# Patient Record
Sex: MALE | Race: OTHER | NOT HISPANIC OR LATINO | Employment: FULL TIME | URBAN - METROPOLITAN AREA
[De-identification: names, ages, dates, MRNs, and addresses within clinical notes are randomized per-mention and may not be internally consistent; named-entity substitution may affect disease eponyms.]

---

## 2022-04-29 ENCOUNTER — HOSPITAL ENCOUNTER (OUTPATIENT)
Facility: CLINIC | Age: 27
Discharge: HOME | End: 2022-04-29
Attending: EMERGENCY MEDICINE
Payer: COMMERCIAL

## 2022-04-29 VITALS
RESPIRATION RATE: 16 BRPM | TEMPERATURE: 98.7 F | DIASTOLIC BLOOD PRESSURE: 82 MMHG | SYSTOLIC BLOOD PRESSURE: 130 MMHG | OXYGEN SATURATION: 99 % | HEART RATE: 83 BPM

## 2022-04-29 DIAGNOSIS — N48.89 PENILE CYST: Primary | ICD-10-CM

## 2022-04-29 PROCEDURE — 99202 OFFICE O/P NEW SF 15 MIN: CPT | Performed by: NURSE PRACTITIONER

## 2022-04-29 PROCEDURE — S9083 URGENT CARE CENTER GLOBAL: HCPCS | Performed by: NURSE PRACTITIONER

## 2022-04-29 ASSESSMENT — ENCOUNTER SYMPTOMS
COUGH: 0
DIARRHEA: 0
WOUND: 0
DIZZINESS: 0
VOMITING: 0
FLANK PAIN: 0
NAUSEA: 0
WEAKNESS: 0
CHILLS: 0
BACK PAIN: 0
FREQUENCY: 0
NECK PAIN: 0
ADENOPATHY: 0
NECK STIFFNESS: 0
ABDOMINAL PAIN: 0
RHINORRHEA: 0
SORE THROAT: 0
MYALGIAS: 0
ARTHRALGIAS: 0
FEVER: 0
NUMBNESS: 0
PALPITATIONS: 0
JOINT SWELLING: 0
DYSURIA: 0
LIGHT-HEADEDNESS: 0
SHORTNESS OF BREATH: 0

## 2022-04-29 NOTE — DISCHARGE INSTRUCTIONS
Warm compresses to area.  Take tylenol/ibuprofen as needed for pain.  Keep area clean and dry until healed.    Followup with urology.    If your symptoms worsen or otherwise concerned, please visit your local emergency department.    Thank you for choosing [facility]!

## 2022-04-29 NOTE — ED PROVIDER NOTES
Emergency Medicine Note  HPI   HISTORY OF PRESENT ILLNESS     27 y/o male c/o penile cyst. Reports he has cyst on penis since a child, never saw specialist regarding sx, but never had an issue with it in the past. States this morning he noticed the area was irritated and draining. Denies fever, chills, penile pain/discharge, testicular pain, problems urinating, concern for STDs, or recent trauma to area.            Patient History   PAST HISTORY     Reviewed from Nursing Triage:  Tobacco  Allergies  Meds  Problems  Med Hx  Surg Hx  Fam Hx  Soc   Hx        History reviewed. No pertinent past medical history.    History reviewed. No pertinent surgical history.    History reviewed. No pertinent family history.    Social History     Tobacco Use   • Smoking status: Never Smoker   • Smokeless tobacco: Never Used   Vaping Use   • Vaping Use: Never used   Substance Use Topics   • Alcohol use: Yes     Comment: socially   • Drug use: Never         Review of Systems   REVIEW OF SYSTEMS     Review of Systems   Constitutional: Negative for chills and fever.   HENT: Negative for congestion, ear pain, rhinorrhea and sore throat.    Respiratory: Negative for cough and shortness of breath.    Cardiovascular: Negative for chest pain, palpitations and leg swelling.   Gastrointestinal: Negative for abdominal pain, diarrhea, nausea and vomiting.   Genitourinary: Negative for dysuria, flank pain, frequency, penile discharge, penile pain, penile swelling, scrotal swelling, testicular pain and urgency.        Penile cyst   Musculoskeletal: Negative for arthralgias, back pain, gait problem, joint swelling, myalgias, neck pain and neck stiffness.   Skin: Negative for rash and wound.   Neurological: Negative for dizziness, weakness, light-headedness and numbness.   Hematological: Negative for adenopathy.   All other systems reviewed and are negative.        VITALS     ED Vitals    Date/Time Temp Pulse Resp BP SpO2 [facility]   04/29/22 1239  -- -- -- 130/82 -- SS   04/29/22 1225 37.1 °C (98.7 °F) 83 16 162/89 99 % HENRI                       Physical Exam   PHYSICAL EXAM     Physical Exam  Vitals reviewed. Exam conducted with a chaperone present.   Constitutional:       Appearance: He is well-developed. He is not ill-appearing or toxic-appearing.   HENT:      Head: Normocephalic and atraumatic.   Cardiovascular:      Rate and Rhythm: Normal rate.   Pulmonary:      Effort: Pulmonary effort is normal. No respiratory distress.   Genitourinary:     Penis: Circumcised.           Comments: Dr. Garcia as chaperone   Musculoskeletal:      Cervical back: Normal range of motion.   Skin:     General: Skin is dry.      Capillary Refill: Capillary refill takes less than 2 seconds.   Neurological:      Mental Status: He is alert and oriented to person, place, and time.   Psychiatric:         Behavior: Behavior is cooperative.         Thought Content: Thought content normal.           PROCEDURES     Incision and Drainage    Date/Time: 4/29/2022 12:47 PM  Performed by: Gisela Wright CRNP  Authorized by: Nery Garcia DO     Consent:     Consent obtained:  Verbal    Consent given by:  Patient    Risks, benefits, and alternatives were discussed: yes      Risks discussed:  Damage to other organs, bleeding, incomplete drainage, infection and pain    Alternatives discussed:  Observation  Fox protocol:     Patient identity confirmed:  Verbally with patient  Location:     Type:  Cyst    Location:  Anogenital    Anogenital location: penis.  Procedure details:     Wound treatment:  Wound left open  Post-procedure details:     Procedure completion:  Tolerated well, no immediate complications  Comments:      Dr. Garcia completely expressed small amount of thick white drainage from cyst         DATA     Results     None              No orders to display       Scoring tools                                 ED Course & MDM   MDM / ED COURSE / CLINICAL  IMPRESSIONS / DISPO     MDM  Number of Diagnoses or Management Options  Penile cyst: new and does not require workup  Diagnosis management comments: Dr. Garcia completely expressed a small amount of thick white drainage from cyst.  Recommend warm compresses, tylenol/motrin prn for pain, keeping area clean and dry.  F/u with urology, Dr. Wilburn's info provided.  Strict instructions given to pt.  Pt verbalized understanding and agrees with plan of care.    Patient Progress  Patient progress: stable      Clinical Impressions as of 04/29/22 1245   Penile cyst     Discharge         Gisela Wright CRNP  04/29/22 1253

## 2023-08-05 ENCOUNTER — APPOINTMENT (OUTPATIENT)
Dept: RADIOLOGY | Age: 28
End: 2023-08-05
Attending: NURSE PRACTITIONER
Payer: COMMERCIAL

## 2023-08-05 ENCOUNTER — HOSPITAL ENCOUNTER (OUTPATIENT)
Facility: CLINIC | Age: 28
Discharge: HOME | End: 2023-08-05
Attending: FAMILY MEDICINE
Payer: COMMERCIAL

## 2023-08-05 VITALS
HEART RATE: 92 BPM | OXYGEN SATURATION: 97 % | RESPIRATION RATE: 20 BRPM | DIASTOLIC BLOOD PRESSURE: 84 MMHG | SYSTOLIC BLOOD PRESSURE: 142 MMHG | TEMPERATURE: 98.4 F

## 2023-08-05 DIAGNOSIS — M25.511 ACUTE PAIN OF RIGHT SHOULDER: Primary | ICD-10-CM

## 2023-08-05 PROCEDURE — 99202 OFFICE O/P NEW SF 15 MIN: CPT | Performed by: NURSE PRACTITIONER

## 2023-08-05 PROCEDURE — S9083 URGENT CARE CENTER GLOBAL: HCPCS | Performed by: NURSE PRACTITIONER

## 2023-08-05 PROCEDURE — 73030 X-RAY EXAM OF SHOULDER: CPT | Mod: 26 | Performed by: NURSE PRACTITIONER

## 2023-08-05 ASSESSMENT — ENCOUNTER SYMPTOMS
CONSTITUTIONAL NEGATIVE: 1
ARTHRALGIAS: 1
PSYCHIATRIC NEGATIVE: 1

## 2023-08-05 NOTE — DISCHARGE INSTRUCTIONS
Apply an ice pack alternating with a heating pad to affected shoulder for 20 mns 1-2x/day   Continue use of Tylenol or NSAIDS such as Motrin/Aleve for pain  Rest and elevate the extremity when pain develops  If symptoms persist, please contact the following:    Referral for Orthopedics:  [facility]   Walk-In Clinic  [address] [place] [address]  [place] [address]  Mon-Fri 5709-4590  [phone]

## 2023-08-05 NOTE — ED ATTESTATION NOTE
I agree with the PA/NP’s history, physical, assessment, and plan of care, with the following exceptions: None     Traci Dukes, DO  08/05/23 1521

## 2023-08-05 NOTE — ED PROVIDER NOTES
Emergency Medicine Note  HPI   HISTORY OF PRESENT ILLNESS     Patient states he has been having right shoulder pain x 1 week after playing ball while at the beach.  He states the pain is intermittent but actually began approximately 1 month ago.  He states the pain was tolerable and he didn't believe that he needed to seek care.  He states that 2 years ago he dislocated his right shoulder and popped it back in place without obtaining any aftercare. He has been taking Motrin and Tylenol for pain.            Patient History   PAST HISTORY     Reviewed from Nursing Triage:       No past medical history on file.    No past surgical history on file.    No family history on file.    Social History     Tobacco Use   • Smoking status: Never   • Smokeless tobacco: Never   Vaping Use   • Vaping Use: Never used   Substance Use Topics   • Alcohol use: Yes     Comment: socially   • Drug use: Never         Review of Systems   REVIEW OF SYSTEMS     Review of Systems   Constitutional: Negative.    Musculoskeletal: Positive for arthralgias.        Right shoulder pain   Psychiatric/Behavioral: Negative.          VITALS     ED Vitals    Date/Time Temp Pulse Resp BP SpO2 [facility]   08/05/23 1405 36.9 °C (98.4 °F) 92 20 142/84 97 % IZABEL                       Physical Exam   PHYSICAL EXAM     Physical Exam  Constitutional:       Appearance: Normal appearance.   HENT:      Head: Normocephalic.   Pulmonary:      Effort: Pulmonary effort is normal.      Breath sounds: Normal breath sounds.   Musculoskeletal:         General: Tenderness present. No swelling or signs of injury. Normal range of motion.      Comments: Right shoulder injury   Skin:     General: Skin is warm and dry.      Capillary Refill: Capillary refill takes less than 2 seconds.   Neurological:      General: No focal deficit present.      Mental Status: He is alert and oriented to person, place, and time.   Psychiatric:         Mood and Affect: Mood normal.         Behavior:  Behavior normal.         Thought Content: Thought content normal.         Judgment: Judgment normal.           PROCEDURES     Procedures     DATA     Results     None              No orders to display       Scoring tools                                  ED Course & MDM   MDM / ED COURSE / CLINICAL IMPRESSION / DISPO     Medical Decision Making  Apply an ice pack alternating with a heating pad to affected shoulder for 20 mns 1-2x/day   Continue use of Tylenol or NSAIDS such as Motrin/Aleve for pain  Rest and elevate the extremity when pain develops  If symptoms persist, please contact the following:    Referral for Orthopedics:  [facility]   Walk-In Clinic  [address]  [address]  [place] [address]  Mon-Fri 9791-8527  [phone]          Acute pain of right shoulder: acute illness or injury  Amount and/or Complexity of Data Reviewed  Radiology: ordered.     Details: Right shoulder  CLINICAL HISTORY:  pain        TECHNIQUE: AP internal rotation, external rotation, Grashey and Y view right  shoulder.     COMPARISON: None     FINDINGS:  There is no fracture.  Alignment is maintained.  No bone lesion or erosion.  Soft tissues within normal limits.        --  IMPRESSION: No acute abnormality right shoulder             Clinical Impression      Acute pain of right shoulder               Bethanie Quevedo CRNP  08/05/23 5348

## 2023-09-18 ENCOUNTER — OFFICE VISIT (OUTPATIENT)
Dept: PRIMARY CARE | Facility: CLINIC | Age: 28
End: 2023-09-18
Payer: COMMERCIAL

## 2023-09-18 VITALS
WEIGHT: 273 LBS | TEMPERATURE: 97.9 F | SYSTOLIC BLOOD PRESSURE: 130 MMHG | BODY MASS INDEX: 32.23 KG/M2 | RESPIRATION RATE: 16 BRPM | OXYGEN SATURATION: 96 % | HEART RATE: 76 BPM | DIASTOLIC BLOOD PRESSURE: 88 MMHG | HEIGHT: 77 IN

## 2023-09-18 DIAGNOSIS — Z23 NEED FOR INFLUENZA VACCINATION: ICD-10-CM

## 2023-09-18 DIAGNOSIS — Z00.00 ANNUAL PHYSICAL EXAM: Primary | ICD-10-CM

## 2023-09-18 PROCEDURE — 90471 IMMUNIZATION ADMIN: CPT | Performed by: STUDENT IN AN ORGANIZED HEALTH CARE EDUCATION/TRAINING PROGRAM

## 2023-09-18 PROCEDURE — 99395 PREV VISIT EST AGE 18-39: CPT | Mod: 25 | Performed by: STUDENT IN AN ORGANIZED HEALTH CARE EDUCATION/TRAINING PROGRAM

## 2023-09-18 PROCEDURE — 3008F BODY MASS INDEX DOCD: CPT | Performed by: STUDENT IN AN ORGANIZED HEALTH CARE EDUCATION/TRAINING PROGRAM

## 2023-09-18 PROCEDURE — 90686 IIV4 VACC NO PRSV 0.5 ML IM: CPT | Performed by: STUDENT IN AN ORGANIZED HEALTH CARE EDUCATION/TRAINING PROGRAM

## 2023-09-18 ASSESSMENT — ENCOUNTER SYMPTOMS
CARDIOVASCULAR NEGATIVE: 1
ALLERGIC/IMMUNOLOGIC NEGATIVE: 1
GASTROINTESTINAL NEGATIVE: 1
CONSTITUTIONAL NEGATIVE: 1
MUSCULOSKELETAL NEGATIVE: 1
HEMATOLOGIC/LYMPHATIC NEGATIVE: 1
RESPIRATORY NEGATIVE: 1
ENDOCRINE NEGATIVE: 1
NEUROLOGICAL NEGATIVE: 1
PSYCHIATRIC NEGATIVE: 1
EYES NEGATIVE: 1

## 2023-09-18 ASSESSMENT — PATIENT HEALTH QUESTIONNAIRE - PHQ9: SUM OF ALL RESPONSES TO PHQ9 QUESTIONS 1 & 2: 0

## 2023-09-18 NOTE — ASSESSMENT & PLAN NOTE
See HPI.  VS reviewed    Plan:  -Labs pending (CBC, CMP, TSH, A1c, Lipid)  -Diet and exercise encouraged.  -Flu vaccine administered in office today.  -Return in 1 year for annual exam.

## 2023-09-18 NOTE — PATIENT INSTRUCTIONS
One of the labs ordered is a fasting lab meaning no food for at least 10 hours before you go to the lab. You can continue to have water and its ok to have black coffee but NO creamer, milk, or sugar as they can affect the results of the tests.

## 2023-09-18 NOTE — PROGRESS NOTES
"Subjective      Patient ID: Alex Hall is a 28 y.o. male here for the following:   Annual Exam      HPI    #Establish Care  PMH:  -None     -Diet: Well balanced diet, Admits could be better on weekends   -Exercise: Exercises regularly, 2-3 x week   -Mood: Good, denies depression but admits Anxiety     Wears lenses/contacts.     Specialist:   -Optometry  -Dermatologist     Due for Flu, COVID (booster) vaccinations  Up to date on all other screening exams/vaccinations    The following have been reviewed and updated as appropriate in this visit:      Allergies  Meds  Problems  Social History      Patient Active Problem List   Diagnosis    Annual physical exam    .    Social History     Tobacco Use    Smoking status: Never    Smokeless tobacco: Never   Vaping Use    Vaping Use: Never used   Substance Use Topics    Alcohol use: Yes     Comment: socially    Drug use: Never       Allergies as of 09/18/2023    (No Known Allergies)       No current outpatient medications on file.      Review of systems as per HPI and below    Review of Systems   Constitutional: Negative.    HENT: Negative.    Eyes: Negative.    Respiratory: Negative.    Cardiovascular: Negative.    Gastrointestinal: Negative.    Endocrine: Negative.    Genitourinary: Negative.    Musculoskeletal: Negative.    Skin: Negative.    Allergic/Immunologic: Negative.    Neurological: Negative.    Hematological: Negative.    Psychiatric/Behavioral: Negative.      Objective   Vitals:   Vitals:    09/18/23 0827   BP: 130/88   BP Location: Left upper arm   Patient Position: Sitting   Pulse: 76   Resp: 16   Temp: 36.6 °C (97.9 °F)   TempSrc: Temporal   SpO2: 96%   Weight: 124 kg (273 lb)   Height: 1.956 m (6' 5\")     Body mass index is 32.37 kg/m².    Physical Exam  Constitutional:       General: He is not in acute distress.     Appearance: He is obese. He is not ill-appearing.   HENT:      Head: Normocephalic and atraumatic.      Right Ear: Tympanic " membrane, ear canal and external ear normal. There is no impacted cerumen.      Left Ear: Tympanic membrane, ear canal and external ear normal. There is no impacted cerumen.      Nose: Nose normal.      Mouth/Throat:      Mouth: Mucous membranes are moist.   Eyes:      General:         Right eye: No discharge.         Left eye: No discharge.      Extraocular Movements: Extraocular movements intact.      Conjunctiva/sclera: Conjunctivae normal.      Pupils: Pupils are equal, round, and reactive to light.   Cardiovascular:      Rate and Rhythm: Normal rate and regular rhythm.      Pulses: Normal pulses.      Heart sounds: Normal heart sounds. No murmur heard.     No friction rub. No gallop.   Pulmonary:      Effort: Pulmonary effort is normal. No respiratory distress.      Breath sounds: Normal breath sounds. No stridor. No wheezing, rhonchi or rales.   Chest:      Chest wall: No tenderness.   Abdominal:      General: Abdomen is flat. There is no distension.      Palpations: Abdomen is soft.      Tenderness: There is no abdominal tenderness.   Musculoskeletal:         General: Normal range of motion.      Cervical back: Normal range of motion. No rigidity.      Right lower leg: No edema.      Left lower leg: No edema.   Lymphadenopathy:      Cervical: No cervical adenopathy.   Skin:     General: Skin is warm and dry.      Capillary Refill: Capillary refill takes less than 2 seconds.   Neurological:      General: No focal deficit present.      Mental Status: He is alert and oriented to person, place, and time.      Cranial Nerves: No cranial nerve deficit.      Sensory: No sensory deficit.      Motor: No weakness.      Coordination: Coordination normal.      Gait: Gait normal.   Psychiatric:         Mood and Affect: Mood normal.         Behavior: Behavior normal.         ASSESSMENT & PLAN    Problem List Items Addressed This Visit        Other    Annual physical exam - Primary     See HPI.  VS reviewed    Plan:  -Labs  pending (CBC, CMP, TSH, A1c, Lipid)  -Diet and exercise encouraged.  -Flu vaccine administered in office today.  -Return in 1 year for annual exam.          Relevant Orders    Comprehensive metabolic panel    Hemoglobin A1c    CBC and Differential    Lipid panel    TSH w reflex FT4   Other Visit Diagnoses     Need for influenza vaccination        Relevant Orders    Influenza vaccine quadrivalent preservative free 6 mon and older IM (FluLaval) (Completed)          Orders Placed This Encounter   Procedures    Influenza vaccine quadrivalent preservative free 6 mon and older IM (FluLaval)    Comprehensive metabolic panel     Standing Status:   Future     Standing Expiration Date:   9/18/2024     Order Specific Question:   Release to patient     Answer:   Immediate [1]    Hemoglobin A1c     Standing Status:   Future     Standing Expiration Date:   9/18/2024     Order Specific Question:   Release to patient     Answer:   Immediate [1]    CBC and Differential     Standing Status:   Future     Standing Expiration Date:   9/18/2024     Order Specific Question:   Release to patient     Answer:   Immediate [1]    Lipid panel     Standing Status:   Future     Standing Expiration Date:   9/18/2024     Order Specific Question:   Release to patient     Answer:   Immediate [1]    TSH w reflex FT4     Standing Status:   Future     Standing Expiration Date:   9/18/2024     Order Specific Question:   Release to patient     Answer:   Immediate [1]           _____________________  Irais Davis MD  09/18/23

## 2023-09-21 NOTE — PROGRESS NOTES
Integrated Behavioral Health Outpatient Initial Visit    Visit Type Performed: In-office     Alex Hall presented today for a behavioral health visit.    Clinician confirmed identification of patient by name and birthdate.      Informed Consent/Confidentiality:  Pt was explained the model of primary care behavioral health we provide at [facility], including the model of care, documentation visibility, and confidentiality:     Model of Care: This is a low-intensity model of care (we provide 8-10 visits of cognitive-behavioral therapy), and the patient has the right to other options of behavioral health care that are indicated for more severe conditions (i.e.: Traditional Outpatient psychotherapy, Intensive Outpatient Programs, Partial Hospitalization Programs, and Inpatient services).     Documentation: The psychologist/licensed behavioral health provider collaborates regularly with the pts PCP regarding the pts treatment. The integrated behavioral health progress notes are visible to the physicians and advanced practitioners in the practice where the pt is being seen, [facility] (S) for continuity of care if pts choose to pursue medium-term therapy through [facility], in addition to [facility]'s billing and compliance departments, as needed.     The visit diagnosis and appointment/scheduling information is visible to the patient's EPIC chart, to provide continuity of care across the [facility] system. The pt will also have access to view their notes via zhouwu (pt portal).     Confidentiality: Also discussed were confidentiality and the limits of confidentiality. Information shared by the patient with the undersigned provider are kept confidential, unless the patient makes an informed written request for to have their information shared with a specific party, or if a  mandates the release of information via a court order. If the patient is at imminent risk of suicide or homicide healthcare providers  (including psychologists and therapists) may need to break confidentiality to ensure the safety of the patient or others (ie: to engage emergency services). If child, elder, or other vulnerable population abuse or neglect is reported, your healthcare providers must follow mandated reporting requirements.     Patient was given the opportunity to ask clarifying questions, and they expressed understanding and consent: Yes     SUBJECTIVE     History of Behavioral Health Treatment  Previous treatment: None.   Previously experienced symptoms of: Anxiety  Other pertinent behavioral health history: None reported      Substance Use History  ETOH/alcohol use: occasional, social use  Other substance or supplement use: drugs: denied.; tobacco: denied; caffeine: denies use      Social History  Important people in pt's life/Support network: His wife, mom, dad, brother, and sister  Lives with: Wife  Cultural practices/Taoism/Spiritual beliefs pertinent to treatment: None  Hobbies/Interests: Watching TV, trying new restaurants, walking or going to the gym, he's a [employer] sports fan   Additional pertinent historical information includes: Works as a  for a company that works with [employer], works from home full-time      Reported Symptoms  Depression symptoms: PHQ 9:  Little Interest or Pleasure in Doing Things: 0-->not at all    Feeling Down, Depressed or Hopeless: 1-->several days (related to work or when anxious)    Trouble Falling or Staying Asleep, or Sleeping Too Much: 0-->not at all    Feeling Tired or Having Little Energy: 0-->not at all    Poor Appetite or Overeatin-->not at all    Feeling Bad about Yourself - or that You are a Failure or Have Let Yourself or Your Family Down: 1-->several days    Trouble Concentrating on Things, Such as Reading the Newspaper or Watching Television: 0-->not at all    Moving or Speaking So Slowly that Other People Could Have Noticed? Or the Opposite - Being So Fidgety: 0-->not  at all    Thoughts that You Would be Better Off Dead or of Hurting Yourself in Some Way: 0-->not at all    PHQ-9: Brief Depression Severity Measure Score: 2    If You Checked Off Any Problems, How Difficult Have These Problems Made It For You to Do Your Work, Take Care of Things at Home, or Get Along with Other People?: somewhat difficult      Anxiety symptoms: JOHNSON-7  Feeling nervous, anxious or on edge: 3-->Nearly every day    Not being able to stop or control worrying: 3-->Nearly every day    Worrying too much about different things: 3-->Nearly every day    Trouble relaxin-->Several days    Being so restless that it is hard to sit still: 1-->Several days    Becoming easily annoyed or irritable: 1-->Several days    Feeling afraid as if something awful might happen: 1-->Several days      GAD7 Total Score: : 13      If you checked off any problems, how difficult have these made it for you to do your work, take care of things at home, or get along with other people?: Somewhat difficult      OBJECTIVE     Mental Status Exam  Appearance: Well Groomed  Speech: Regular  Psychomotor Functioning: WNL  Eye Contact: WNL  Orientation: Fully oriented  Attention: WNL  Concentration: WNL  Recent Memory: WNL  Remote Memory: WNL  Thought Content: Appropriate  Thought Process: WNL  Insight: Intact  Perceptual Function: Normal  Delusions: None or age appropriate  Affect: Full Range  Mood: Euthymic (normal)      ASSESSMENT     Psychotropic medications: He is open to medication but wants to try therapy first  No current outpatient medications on file.     No current facility-administered medications for this visit.         Suicidal Ideation/Homicidal Ideation Risk Assessment  Risk Factors: None reported  Protective factors: Effective and accessible mental health care , Connectedness to individuals, family, community, and social institutions and Problem-solving and conflict resolution skills    Suicidal Ideation: Not Present, No  "intention or plan.  Self Injurious Behavior: Not Present  Homicidal Ideation: Not Present  Estimate of Current Risk: Minimal risk    Plan for Safety-   N/A:  Risk is assessed to be minimal; therefore, developing a safety plan is not indicated at this time.      Screening measures administered during this visit  PHQ-9: Brief Depression Severity Measure Score: 2  GAD7 Total Score: : 13      ASSESSMENT / IMPRESSIONS  Alex Hall seems to be experiencing a long history of anxiety with increasing symptoms related to work stress. Reports feeling like he is always on edge and that his brain is always moving. He also reports experiencing \"intrusive thoughts\" that upset him. He will often check to make sure he locked the door and that he set his alarm. Patient appears to have good insight into his thoughts and feelings. It is likely CBT will be helpful in decreasing symptoms of anxiety. Currently, he appears to meet criteria for unspecified anxiety disorder. Additional evaluation is needed to determine if he meets criteria for and specific anxiety disorder. Differential diagnoses include Obsessive Compulsive Disorder.  Associated factors include: He feels \"stuck\" at his job.  Prognostic protective factors include, good support system, open to therapy. Prognostic risk factors include, none identified      PLAN     Goals:  -To not get down during the work day  -Improve anxiety management and learn coping strategies   -Improve management of intrusive thoughts    Recommendations for treatment: Individual Therapy, 30 minutes 2 times monthly, potentially connect with traditional outpatient therapy    Recommendations for Interventions: Anxiety Reduction Techniques and Cognitive Behavior Therapy    Next visit plan  Review goals, discuss CBT model      I spent 60 minutes on this date of service performing the following activities: providing counseling and education.  "

## 2023-10-04 ENCOUNTER — OFFICE VISIT (OUTPATIENT)
Dept: BEHAVIORAL HEALTH | Facility: CLINIC | Age: 28
End: 2023-10-04
Payer: COMMERCIAL

## 2023-10-04 DIAGNOSIS — F41.9 ANXIETY DISORDER, UNSPECIFIED TYPE: Primary | ICD-10-CM

## 2023-10-04 PROCEDURE — 90791 PSYCH DIAGNOSTIC EVALUATION: CPT | Performed by: COUNSELOR

## 2023-10-04 ASSESSMENT — COGNITIVE AND FUNCTIONAL STATUS - GENERAL
ATTENTION: WNL
MOOD: EUTHYMIC (NORMAL)
INSIGHT: INTACT
PSYCHOMOTOR FUNCTIONING: WNL
RECENT MEMORY: WNL
REMOTE MEMORY: WNL
THOUGHT_PROCESS: WNL
SPEECH: REGULAR
APPEARANCE: WELL GROOMED
EYE_CONTACT: WNL
AFFECT: FULL RANGE
PERCEPTUAL FUNCTION: NORMAL
CONCENTRATION: WNL
ORIENTATION: FULLY ORIENTED
AROUSAL LEVEL: ALERT
IMPULSE CONTROL: INTACT
DELUSIONS: NONE OR AGE APPROPRIATE
THOUGHT_CONTENT: APPROPRIATE

## 2023-10-04 NOTE — PROGRESS NOTES
Integrated Behavioral Health Follow-up Visit Note  Visit number: 1     Visit Type Performed: In-office     Alex Hall presented today for a behavioral health visit.      SUBJECTIVE     Symptoms  Depression symptoms: sadness, irritable mood and difficulty concentrating  Anxiety symptoms: moderate anxiety/worry, difficulty concentrating and irritability      OBJECTIVE     Mental Status Evaluation  Patient's mood and affect were consistent with the context, and consistent with their baseline: Yes   Comments:  calm and pleasant, awake and alert; oriented to person, place, and time      Suicidal Ideation/Homicidal Ideation Risk Assessment: not assessed. If not assessed, reason:  Assessment of SI/HI is not indicated at this time, based on prior assessments (pt has denied SI/HI in all visits with the undersigned provider, pt presents with no significant risk factors, pt does exhibit significant protective factors)    Risk Factors: None reported  Protective factors: Effective and accessible mental health care , Connectedness to individuals, family, community, and social institutions and Problem-solving and conflict resolution skills  Estimate of Current Risk: Minimal risk    Plan for Safety-   N/A:  Risk is assessed to be minimal; therefore, developing a safety plan is not indicated at this time.      Interventions  Cognitive Behavior Therapy  We discussed pt's past weeks and recent symptomology. Reviewed treatment goals. Provided psychoeducation of CBT. Assisted pt in practicing identifying mood changes and related automatic thoughts and set related homework assignment.       Psychotropic medications: N/A  No current outpatient medications on file.     No current facility-administered medications for this visit.       ASSESSMENT       Progress  Patient's progress toward their goals is generally showing no change   Patient's symptomology is unchanged   Reports increased stress and anxiety lately due to work. He has  had fewer intrusive thoughts.Expressed understanding of CBT concepts and was responsive to suggestions.       PLAN     Goals:  -To not get down during the work day  -Improve anxiety management and learn coping strategies   -Improve management of intrusive thoughts    Recommendations  Individual Therapy, 30 minutes 1-2 times monthly    Next visit plan:  Review pt progress, review pt homework, begin discussion of cognitive restructuring techniques      Patient Instructions:  Once daily write about times when you noticed a change in your mood (ie: happy, sad, anxious, frustrated, etc.). Identify the mood, then write one or two sentences about what was going through your mind at that time. Have this info available during our next visit for us to review together.       I spent 33 minutes on this date of service performing the following activities: providing counseling and education.

## 2023-10-26 ENCOUNTER — OFFICE VISIT (OUTPATIENT)
Dept: BEHAVIORAL HEALTH | Facility: CLINIC | Age: 28
End: 2023-10-26
Payer: COMMERCIAL

## 2023-10-26 ENCOUNTER — APPOINTMENT (OUTPATIENT)
Dept: LAB | Facility: CLINIC | Age: 28
End: 2023-10-26
Attending: STUDENT IN AN ORGANIZED HEALTH CARE EDUCATION/TRAINING PROGRAM
Payer: COMMERCIAL

## 2023-10-26 DIAGNOSIS — Z00.00 ANNUAL PHYSICAL EXAM: ICD-10-CM

## 2023-10-26 DIAGNOSIS — F41.9 ANXIETY DISORDER, UNSPECIFIED TYPE: Primary | ICD-10-CM

## 2023-10-26 LAB
ALBUMIN SERPL-MCNC: 4.9 G/DL (ref 3.5–5.7)
ALP SERPL-CCNC: 44 IU/L (ref 34–125)
ALT SERPL-CCNC: 28 IU/L (ref 7–52)
ANION GAP SERPL CALC-SCNC: 10 MEQ/L (ref 3–15)
AST SERPL-CCNC: 25 IU/L (ref 13–39)
BASOPHILS # BLD: 0.04 K/UL (ref 0.01–0.1)
BASOPHILS NFR BLD: 0.6 %
BILIRUB SERPL-MCNC: 0.9 MG/DL (ref 0.3–1.2)
BUN SERPL-MCNC: 13 MG/DL (ref 7–25)
CALCIUM SERPL-MCNC: 10.2 MG/DL (ref 8.6–10.3)
CHLORIDE SERPL-SCNC: 102 MEQ/L (ref 98–107)
CHOLEST SERPL-MCNC: 185 MG/DL
CO2 SERPL-SCNC: 28 MEQ/L (ref 21–31)
CREAT SERPL-MCNC: 0.8 MG/DL (ref 0.7–1.3)
DIFFERENTIAL METHOD BLD: ABNORMAL
EOSINOPHIL # BLD: 0.12 K/UL (ref 0.04–0.54)
EOSINOPHIL NFR BLD: 1.7 %
ERYTHROCYTE [DISTWIDTH] IN BLOOD BY AUTOMATED COUNT: 12 % (ref 11.6–14.4)
EST. AVERAGE GLUCOSE BLD GHB EST-MCNC: 103 MG/DL
GFR SERPL CREATININE-BSD FRML MDRD: >60 ML/MIN/1.73M*2
GLUCOSE SERPL-MCNC: 100 MG/DL (ref 70–99)
HBA1C MFR BLD: 5.2 %
HCT VFR BLDCO AUTO: 45.3 % (ref 40.1–51)
HDLC SERPL-MCNC: 48 MG/DL
HDLC SERPL: 3.9 {RATIO}
HGB BLD-MCNC: 15.1 G/DL (ref 13.7–17.5)
IMM GRANULOCYTES # BLD AUTO: 0.02 K/UL (ref 0–0.08)
IMM GRANULOCYTES NFR BLD AUTO: 0.3 %
LDLC SERPL CALC-MCNC: 95 MG/DL
LYMPHOCYTES # BLD: 2.58 K/UL (ref 1.2–3.5)
LYMPHOCYTES NFR BLD: 37.5 %
MCH RBC QN AUTO: 30.8 PG (ref 28–33.2)
MCHC RBC AUTO-ENTMCNC: 33.3 G/DL (ref 32.2–36.5)
MCV RBC AUTO: 92.3 FL (ref 83–98)
MONOCYTES # BLD: 0.54 K/UL (ref 0.3–1)
MONOCYTES NFR BLD: 7.8 %
NEUTROPHILS # BLD: 3.58 K/UL (ref 1.7–7)
NEUTS SEG NFR BLD: 52.1 %
NONHDLC SERPL-MCNC: 137 MG/DL
NRBC BLD-RTO: 0 %
PDW BLD AUTO: 9.5 FL (ref 9.4–12.4)
PLATELET # BLD AUTO: 378 K/UL (ref 150–350)
POTASSIUM SERPL-SCNC: 4.9 MEQ/L (ref 3.5–5.1)
PROT SERPL-MCNC: 7.5 G/DL (ref 6–8.2)
RBC # BLD AUTO: 4.91 M/UL (ref 4.5–5.8)
SODIUM SERPL-SCNC: 140 MEQ/L (ref 136–145)
TRIGL SERPL-MCNC: 208 MG/DL
TSH SERPL DL<=0.05 MIU/L-ACNC: 1.86 MIU/L (ref 0.34–5.6)
WBC # BLD AUTO: 6.88 K/UL (ref 3.8–10.5)

## 2023-10-26 PROCEDURE — 82040 ASSAY OF SERUM ALBUMIN: CPT

## 2023-10-26 PROCEDURE — 80053 COMPREHEN METABOLIC PANEL: CPT

## 2023-10-26 PROCEDURE — 83036 HEMOGLOBIN GLYCOSYLATED A1C: CPT

## 2023-10-26 PROCEDURE — 80061 LIPID PANEL: CPT

## 2023-10-26 PROCEDURE — 84443 ASSAY THYROID STIM HORMONE: CPT

## 2023-10-26 PROCEDURE — 90832 PSYTX W PT 30 MINUTES: CPT | Performed by: COUNSELOR

## 2023-10-26 PROCEDURE — 85025 COMPLETE CBC W/AUTO DIFF WBC: CPT

## 2023-10-26 PROCEDURE — 36415 COLL VENOUS BLD VENIPUNCTURE: CPT

## 2023-10-26 NOTE — PROGRESS NOTES
"Integrated Behavioral Health Follow-up Visit Note  Visit number: 2     Visit Type Performed: In-office     Alex Hall presented today for a behavioral health visit.      SUBJECTIVE     Symptoms  Depression symptoms: depressed mood  Anxiety symptoms: moderate anxiety/worry and difficulty concentrating/going \"blank\"      OBJECTIVE     Mental Status Evaluation  Patient's mood and affect were consistent with the context, and consistent with their baseline: Yes   Comments:  calm and pleasant, awake and alert; oriented to person, place, and time      Suicidal Ideation/Homicidal Ideation Risk Assessment: not assessed. If not assessed, reason:  Assessment of SI/HI is not indicated at this time, based on prior assessments (pt has denied SI/HI in all visits with the undersigned provider, pt presents with no significant risk factors, pt does exhibit significant protective factors)    Risk Factors: None reported  Protective factors: Effective and accessible mental health care , Connectedness to individuals, family, community, and social institutions and Problem-solving and conflict resolution skills  Estimate of Current Risk: Minimal risk    Plan for Safety-   N/A:  Risk is assessed to be minimal; therefore, developing a safety plan is not indicated at this time.      Interventions  Cognitive Behavior Therapy  We discussed pt's past weeks and recent stressors. We discussed pt's homework to identify mood changes and associated negative automatic thoughts. Explained cognitive restructuring technique examine the evidence and assisted pt in applying concepts to examples from the homework. Recommended he reach out PCP to discuss medication.       Psychotropic medications: N/A  No current outpatient medications on file.     No current facility-administered medications for this visit.       ASSESSMENT       Progress  Patient's progress toward their goals is generally showing no change (too soon)  Patient's symptomology is " unchanged (too soon)  Expressed understanding of cognitive restructuring technique and was able to reframe negative thoughts through the in-session exercise. He applied for a new job.  He is interested in starting medication and will reach out to PCP.      PLAN     Goals:  -To not get down during the work day  -Improve anxiety management and learn coping strategies   -Improve management of intrusive thoughts    Recommendations  Individual Therapy, 30 minutes 1-2 times monthly    Next visit plan:  Review pt progress, review pt homework, begin discussion of cognitive restructuring techniques      Patient Instructions:  1. Write about times when you notice a change in your mood, identify the mood  2. Identify the thoughts going through your head  3. If the thoughts are negative, examine the evidence (https://www.therapistaid.com/worksheets/iyvhqtj-tflhnkzm-cn-trial.pdf)  4. Reframe the thought in a more fair/rational way    I spent 39 minutes on this date of service performing the following activities: providing counseling and education.

## 2023-10-27 ENCOUNTER — TELEPHONE (OUTPATIENT)
Dept: PRIMARY CARE | Facility: CLINIC | Age: 28
End: 2023-10-27
Payer: COMMERCIAL

## 2023-11-16 ENCOUNTER — OFFICE VISIT (OUTPATIENT)
Dept: BEHAVIORAL HEALTH | Facility: CLINIC | Age: 28
End: 2023-11-16
Payer: COMMERCIAL

## 2023-11-16 DIAGNOSIS — F41.9 ANXIETY DISORDER, UNSPECIFIED TYPE: Primary | ICD-10-CM

## 2023-11-16 PROCEDURE — 90834 PSYTX W PT 45 MINUTES: CPT | Performed by: COUNSELOR

## 2023-11-21 ENCOUNTER — TELEMEDICINE (OUTPATIENT)
Dept: PRIMARY CARE | Facility: CLINIC | Age: 28
End: 2023-11-21
Payer: COMMERCIAL

## 2023-11-21 DIAGNOSIS — F41.9 ANXIETY AND DEPRESSION: Primary | ICD-10-CM

## 2023-11-21 DIAGNOSIS — F32.A ANXIETY AND DEPRESSION: Primary | ICD-10-CM

## 2023-11-21 PROCEDURE — 99213 OFFICE O/P EST LOW 20 MIN: CPT | Mod: 95 | Performed by: STUDENT IN AN ORGANIZED HEALTH CARE EDUCATION/TRAINING PROGRAM

## 2023-11-21 RX ORDER — HYDROXYZINE HYDROCHLORIDE 25 MG/1
25 TABLET, FILM COATED ORAL 3 TIMES DAILY PRN
Qty: 90 TABLET | Refills: 0 | Status: SHIPPED | OUTPATIENT
Start: 2023-11-21 | End: 2023-12-22

## 2023-11-21 RX ORDER — ESCITALOPRAM OXALATE 10 MG/1
10 TABLET ORAL DAILY
Qty: 30 TABLET | Refills: 0 | Status: SHIPPED | OUTPATIENT
Start: 2023-11-21 | End: 2023-12-22 | Stop reason: SDUPTHER

## 2023-11-21 ASSESSMENT — ENCOUNTER SYMPTOMS
HEMATOLOGIC/LYMPHATIC NEGATIVE: 1
CONSTITUTIONAL NEGATIVE: 1
GASTROINTESTINAL NEGATIVE: 1
MUSCULOSKELETAL NEGATIVE: 1
RESPIRATORY NEGATIVE: 1
ENDOCRINE NEGATIVE: 1
EYES NEGATIVE: 1
NERVOUS/ANXIOUS: 1
NEUROLOGICAL NEGATIVE: 1
CARDIOVASCULAR NEGATIVE: 1
ALLERGIC/IMMUNOLOGIC NEGATIVE: 1

## 2023-11-21 NOTE — PROGRESS NOTES
Verification of Patient Location:  The patient affirms they are currently located in the following state: [place]    Request for Consent:    Audio and Video Encounter   Hello, my name is Ameer Alexandr Davis MD.  Before we proceed, can you please verify your identification by telling me your full name and date of birth?  Can you tell me who is in the room with you?    You and I are about to have a telemedicine check-in or visit because you have requested it.  This is a live video-conference.  I am a real person, speaking to you in real time.  There is no one else with me on the video-conference. I am not recording this conversation and I am asking you not to record it.  This telemedicine visit will be billed to your health insurance or you, if you are self-insured.  You understand you will be responsible for any copayments or coinsurances that apply to your telemedicine visit.  Communication platform used for this encounter:  [employer] Video Visit (Epic Video Client)       Before starting our telemedicine visit, I am required to get your consent for this virtual check-in or visit by telemedicine. Do you consent?      Patient Response to Request for Consent:  Yes      Visit Documentation:  Subjective     Patient ID: Alex Hall is a 28 y.o. male.  1995      HPI    #Anxiety and Depression  -Patient has been following with his therapist. After discussion and talking about his anxiety/depression that came up with the agreement that he should probably be on some sort of medication to help with his symptoms.    TSH normal from 10/26/2030    The following have been reviewed and updated as appropriate in this visit:        Review of Systems   Constitutional: Negative.    HENT: Negative.    Eyes: Negative.    Respiratory: Negative.    Cardiovascular: Negative.    Gastrointestinal: Negative.    Endocrine: Negative.    Genitourinary: Negative.    Musculoskeletal: Negative.    Skin: Negative.    Allergic/Immunologic:  Negative.    Neurological: Negative.    Hematological: Negative.    Psychiatric/Behavioral: The patient is nervous/anxious.        Assessment/Plan   Diagnoses and all orders for this visit:    Anxiety and depression (Primary)  Assessment & Plan:  Start Lexapro 10 mg once daily.  Start as needed Atarax as needed for severe anxiety/panic attacks  Following with therapist already  [facility]    Orders:  -     escitalopram (LEXAPRO) 10 mg tablet; Take 1 tablet (10 mg total) by mouth daily.  -     hydrOXYzine (ATARAX) 25 mg tablet; Take 1 tablet (25 mg total) by mouth 3 (three) times a day as needed for anxiety.    Time Spent:  I spent 12 minutes on this date of service performing the following activities: obtaining history, entering orders, documenting, preparing for visit, obtaining / reviewing records and providing counseling and education.

## 2023-11-21 NOTE — ASSESSMENT & PLAN NOTE
Start Lexapro 10 mg once daily.  Start as needed Atarax as needed for severe anxiety/panic attacks  Following with therapist already  Follow-up in 1 month via [facility]

## 2023-12-14 ENCOUNTER — OFFICE VISIT (OUTPATIENT)
Dept: BEHAVIORAL HEALTH | Facility: CLINIC | Age: 28
End: 2023-12-14
Payer: COMMERCIAL

## 2023-12-14 DIAGNOSIS — F41.9 ANXIETY DISORDER, UNSPECIFIED TYPE: Primary | ICD-10-CM

## 2023-12-14 PROCEDURE — 90832 PSYTX W PT 30 MINUTES: CPT | Performed by: COUNSELOR

## 2023-12-14 NOTE — PROGRESS NOTES
Integrated Behavioral Health Follow-up Visit Note  Visit number: 3     Visit Type Performed: In-office     Alex Hall presented today for a behavioral health visit.      SUBJECTIVE     Symptoms  Depression symptoms: none reported  Anxiety symptoms: moderate anxiety/worry      OBJECTIVE     Mental Status Evaluation  Patient's mood and affect were consistent with the context, and consistent with their baseline: Yes   Comments:  calm and pleasant, awake and alert; oriented to person, place, and time      Suicidal Ideation/Homicidal Ideation Risk Assessment: not assessed. If not assessed, reason:  Assessment of SI/HI is not indicated at this time, based on prior assessments (pt has denied SI/HI in all visits with the undersigned provider, pt presents with no significant risk factors, pt does exhibit significant protective factors)    Risk Factors: None reported  Protective factors: Effective and accessible mental health care , Connectedness to individuals, family, community, and social institutions and Problem-solving and conflict resolution skills  Estimate of Current Risk: Minimal risk    Plan for Safety-   N/A:  Risk is assessed to be minimal; therefore, developing a safety plan is not indicated at this time.      Interventions  Cognitive Behavior Therapy  Reviewed updates since starting medication. We discussed pt's past weeks and recent stressors. We discussed pt's homework to practice cognitive restructuring technique examine the evidence. Introduced additional cognitive restructuring techniques, socratic questioning and identifying the worst, best, and most realistic-case scenarios.       Psychotropic medications: He started the Lexapro about two weeks ago, he has not taken the Atarax, no known adherence issues, too early to assess effectiveness  Current Outpatient Medications   Medication Sig Dispense Refill   • escitalopram (LEXAPRO) 10 mg tablet Take 1 tablet (10 mg total) by mouth daily. 30 tablet 0  "  • hydrOXYzine (ATARAX) 25 mg tablet Take 1 tablet (25 mg total) by mouth 3 (three) times a day as needed for anxiety. 90 tablet 0     No current facility-administered medications for this visit.       ASSESSMENT       Progress  Patient's progress toward their goals is generally improving  Patient's symptomology is gradually improving   He started the Lexapro two weeks ago and has noted fewer mood changes and that his anxiety has been less heightened. He experienced increased anxiety when he first started the meds related fears about the potential side effects (e.g. he worried he would have a heart attack or have suicidal thoughts). Reports he has not had any side effects. He reports improved management of intrusive thoughts by labelling them as \"intrusive thoughts\" and trying to not spend more time thinking about them. Expressed understanding of additional cognitive restructuring techniques       PLAN     Goals:  -To not get down during the work day  -Improve anxiety management and learn coping strategies   -Improve management of intrusive thoughts    Recommendations  Individual Therapy, 30 minutes 1-2 times monthly    Next visit plan:  Review pt progress, review pt homework, continue discussion of cognitive restructuring techniques      Patient Instructions:  1. Write about times when you notice a change in your mood, identify the mood  2. Identify the thoughts going through your head  3. If the thoughts are negative, examine the evidence (https://www.therapistLeanMarket.com/worksheets/eolorzo-nnxtuzyc-xv-trial.pdf) and/or ask yourself the questions below, and/or identify the worst, best, and most realistic-case scenarios     How to Question Stressful, Angry, Anxious, or Depressed Thinking     1. Am I upsetting myself unnecessarily? How can I see this another way?  2. Is my thinking working for or against me? How could I view this in a less upsetting way?  3. What am I demanding must happen? What do I want or prefer, " rather than need?  4. Am I making something too terrible? Is it that awful? What would be so terrible about that?  5. Am I labelling a person? What is the action I don’t like?  6. What is untrue about my thoughts? How can I stick to the facts?  7. Am I using extreme or black-and-white language? What words would be more accurate?  8. Am I fortune-telling or mind-reading in a way that gets me upset or unhappy? What are the odds or chances that it will really turn out the way I’m thinking or imagining?  9. What are my options in this situation? How would I like to respond?  10. What are more moderate, helpful, or realistic statements to replace the upsetting ones?  11. Have I had any experiences that show that this thought might not be completely true?  12. If my best friend or someone I loved had this thought, what would I tell them?  13. If someone I care about knew I was thinking this thought, what would they say to me?  14. Are there strengths in me or positives in the situation that I am ignoring?  15. When I am not feeling this way, do I think about this situation differently? How?  16. Have I been in this type of situation before? What happened? What have I learned from prior experiences that could help me now?  17. Five years from now, if I look back on this situation, will I look at it any differently? Will I focus on any different part of my experience?  18. Am I blaming myself for something over which I do not have complete control?      I spent 33 minutes on this date of service performing the following activities: providing counseling and education.

## 2023-12-19 DIAGNOSIS — F41.9 ANXIETY AND DEPRESSION: ICD-10-CM

## 2023-12-19 DIAGNOSIS — F32.A ANXIETY AND DEPRESSION: ICD-10-CM

## 2023-12-22 ENCOUNTER — TELEMEDICINE (OUTPATIENT)
Dept: PRIMARY CARE | Facility: CLINIC | Age: 28
End: 2023-12-22
Payer: COMMERCIAL

## 2023-12-22 DIAGNOSIS — F32.A ANXIETY AND DEPRESSION: Primary | ICD-10-CM

## 2023-12-22 DIAGNOSIS — F41.9 ANXIETY AND DEPRESSION: Primary | ICD-10-CM

## 2023-12-22 PROCEDURE — 99213 OFFICE O/P EST LOW 20 MIN: CPT | Mod: 95 | Performed by: STUDENT IN AN ORGANIZED HEALTH CARE EDUCATION/TRAINING PROGRAM

## 2023-12-22 RX ORDER — ESCITALOPRAM OXALATE 10 MG/1
10 TABLET ORAL DAILY
Qty: 30 TABLET | Refills: 0 | OUTPATIENT
Start: 2023-12-22

## 2023-12-22 RX ORDER — ESCITALOPRAM OXALATE 10 MG/1
10 TABLET ORAL DAILY
Qty: 30 TABLET | Refills: 0 | Status: SHIPPED | OUTPATIENT
Start: 2023-12-22 | End: 2024-01-22 | Stop reason: ALTCHOICE

## 2023-12-22 RX ORDER — HYDROXYZINE HYDROCHLORIDE 25 MG/1
TABLET, FILM COATED ORAL
Qty: 90 TABLET | Refills: 0 | Status: SHIPPED | OUTPATIENT
Start: 2023-12-22 | End: 2024-02-22 | Stop reason: ALTCHOICE

## 2023-12-22 RX ORDER — ESCITALOPRAM OXALATE 5 MG/1
5 TABLET ORAL DAILY
Qty: 30 TABLET | Refills: 0 | Status: SHIPPED | OUTPATIENT
Start: 2023-12-22 | End: 2024-01-22 | Stop reason: ALTCHOICE

## 2023-12-22 ASSESSMENT — ENCOUNTER SYMPTOMS
RESPIRATORY NEGATIVE: 1
GASTROINTESTINAL NEGATIVE: 1
ALLERGIC/IMMUNOLOGIC NEGATIVE: 1
EYES NEGATIVE: 1
HEMATOLOGIC/LYMPHATIC NEGATIVE: 1
ENDOCRINE NEGATIVE: 1
PSYCHIATRIC NEGATIVE: 1
NEUROLOGICAL NEGATIVE: 1
CONSTITUTIONAL NEGATIVE: 1
MUSCULOSKELETAL NEGATIVE: 1
CARDIOVASCULAR NEGATIVE: 1

## 2023-12-22 NOTE — PROGRESS NOTES
Verification of Patient Location:  The patient affirms they are currently located in the following state: [place]    Request for Consent:    Audio and Video Encounter   Hello, my name is Ameer Alexandr Davis MD.  Before we proceed, can you please verify your identification by telling me your full name and date of birth?  Can you tell me who is in the room with you?    You and I are about to have a telemedicine check-in or visit because you have requested it.  This is a live video-conference.  I am a real person, speaking to you in real time.  There is no one else with me on the video-conference. I am not recording this conversation and I am asking you not to record it.  This telemedicine visit will be billed to your health insurance or you, if you are self-insured.  You understand you will be responsible for any copayments or coinsurances that apply to your telemedicine visit.  Communication platform used for this encounter:  [employer] Video Visit (Epic Video Client)       Before starting our telemedicine visit, I am required to get your consent for this virtual check-in or visit by telemedicine. Do you consent?      Patient Response to Request for Consent:  Yes      Visit Documentation:  Subjective     Patient ID: Alex Hall is a 28 y.o. male.  1995    HPI     #Anxiety and Depression  -Started on Lexapro 10 mg once daily last visit  -Patient has been following with his therapist.   -Today patient reports that his symptoms have been better controlled on Lexapro 10 mg once daily.  He still does admit to some anxiety but never feels like it controls him.  He does still feel like there is room to improve and would be interested in trying Lexapro 15 mg once daily  -Patient denies side effects related to medication.  -Patient denies SI/HI     The following have been reviewed and updated as appropriate in this visit:        Review of Systems   Constitutional: Negative.    HENT: Negative.    Eyes: Negative.     Respiratory: Negative.    Cardiovascular: Negative.    Gastrointestinal: Negative.    Endocrine: Negative.    Genitourinary: Negative.    Musculoskeletal: Negative.    Skin: Negative.    Allergic/Immunologic: Negative.    Neurological: Negative.    Hematological: Negative.    Psychiatric/Behavioral: Negative.        Assessment/Plan   Diagnoses and all orders for this visit:    Anxiety and depression (Primary)  Assessment & Plan:  Increase Lexapro to 15 mg once daily  Follow-up in 1 month via [facility]   Strict return to office/ER precautions discussed in detail    Orders:  -     escitalopram (LEXAPRO) 5 mg tablet; Take 1 tablet (5 mg total) by mouth daily.  -     escitalopram (LEXAPRO) 10 mg tablet; Take 1 tablet (10 mg total) by mouth daily.    Time Spent:  I spent 12 minutes on this date of service performing the following activities: obtaining history, entering orders, documenting, preparing for visit, obtaining / reviewing records and providing counseling and education.

## 2023-12-22 NOTE — ASSESSMENT & PLAN NOTE
Increase Lexapro to 15 mg once daily  Follow-up in 1 month via [facility]   Strict return to office/ER precautions discussed in detail

## 2024-01-10 ENCOUNTER — OFFICE VISIT (OUTPATIENT)
Dept: BEHAVIORAL HEALTH | Facility: CLINIC | Age: 29
End: 2024-01-10
Payer: COMMERCIAL

## 2024-01-10 DIAGNOSIS — F41.9 ANXIETY DISORDER, UNSPECIFIED TYPE: Primary | ICD-10-CM

## 2024-01-10 PROCEDURE — 90832 PSYTX W PT 30 MINUTES: CPT | Performed by: COUNSELOR

## 2024-01-10 NOTE — PROGRESS NOTES
Integrated Behavioral Health Follow-up Visit Note  Visit number: 4    Visit Type Performed: In-office     Alex Hall presented today for a behavioral health visit.      SUBJECTIVE     Symptoms  Depression symptoms: none reported  Anxiety symptoms: moderate anxiety/worry      OBJECTIVE     Mental Status Evaluation  Patient's mood and affect were consistent with the context, and consistent with their baseline: Yes   Comments:  calm and pleasant, awake and alert; oriented to person, place, and time      Suicidal Ideation/Homicidal Ideation Risk Assessment: not assessed. If not assessed, reason:  Assessment of SI/HI is not indicated at this time, based on prior assessments (pt has denied SI/HI in all visits with the undersigned provider, pt presents with no significant risk factors, pt does exhibit significant protective factors)    Risk Factors: None reported  Protective factors: Effective and accessible mental health care , Connectedness to individuals, family, community, and social institutions and Problem-solving and conflict resolution skills  Estimate of Current Risk: Minimal risk    Plan for Safety-   N/A:  Risk is assessed to be minimal; therefore, developing a safety plan is not indicated at this time.      Interventions  Cognitive Behavior Therapy   Processed increased anxiety last week and the potential causes. Reviewed pt's homework to practice cognitive restructuring techniques. Provided psychoeducation of the anxiety spiral. Provided psychoeducation of the relaxation response and diaphragmatic breathing. Introduced the 5 senses grounding technique and helped him practice in session.       Psychotropic medications: Noted increased anxiety after increasing Lexapro from 10 to 15mg, feeling better this week  Current Outpatient Medications   Medication Sig Dispense Refill   • escitalopram (LEXAPRO) 10 mg tablet Take 1 tablet (10 mg total) by mouth daily. 30 tablet 0   • escitalopram (LEXAPRO) 5 mg tablet  "Take 1 tablet (5 mg total) by mouth daily. 30 tablet 0   • hydrOXYzine (ATARAX) 25 mg tablet TAKE 1 TABLET BY MOUTH 3 TIMES A DAY AS NEEDED FOR ANXIETY. 90 tablet 0     No current facility-administered medications for this visit.       ASSESSMENT       Progress  Patient's progress toward their goals is generally improving  Patient's symptomology is gradually improving   He felt very anxious last week, which he thinks was related to returning to work after the holidays and increasing the Lexapro dose. He reports improvement this week. Overall, he feels like the Lexapro has helped him to feel more \"in control\" and have fewer mood swings. He has continued to practice and see benefit from using the cognitive restructuring techniques.      PLAN     Goals:  -To not get down during the work day  -Improve anxiety management and learn coping strategies   -Improve management of intrusive thoughts    Recommendations  Individual Therapy, 30 minutes 1-2 times monthly    Next visit plan:  Review pt progress, review pt homework, continue discussion of cognitive restructuring and relaxation techniques       Patient Instructions:  Practice deep breathing techniques once daily when you are calm. Below are links to various demonstrations/examples:  https://www.Ret[employer] Rocket.com/watch?v=NF7bCiuTyPY   https://www.Zumigoube.com/watch?v=EYQsRBNYdPk&feature=emb_logo    https://www.freshbag/worksheets/deep-breathing-worksheet.pdf  https://www.freshbag/worksheets/relaxation-techniques.pdf     And here are some apps with relaxation/breathing tools:  -Mindfulness     -Virtual Hopebox (Relax Me)    -Tactical Breather   -Vndlzwl1Turdl  -Headspace    -Calm  -OMG I Can Meditate!  -CBT-I  (Tools -->Quiet Your Mind)  -PTSD  (Manage Symptoms Tools)       5 Senses Grounding   5 things you can see  4 things you can feel  3 things you can hear  2 things you can smell  1 thing you can taste    1. Write about times when you notice a " change in your mood, identify the mood  2. Identify the thoughts going through your head  3. If the thoughts are negative, examine the evidence (https://www.therapistSeventh Continent.com/worksheets/iuipjjy-vrtspzta-ji-trial.pdf) and/or ask yourself the questions below, and/or identify the worst, best, and most realistic-case scenarios     How to Question Stressful, Angry, Anxious, or Depressed Thinking     1. Am I upsetting myself unnecessarily? How can I see this another way?  2. Is my thinking working for or against me? How could I view this in a less upsetting way?  3. What am I demanding must happen? What do I want or prefer, rather than need?  4. Am I making something too terrible? Is it that awful? What would be so terrible about that?  5. Am I labelling a person? What is the action I don’t like?  6. What is untrue about my thoughts? How can I stick to the facts?  7. Am I using extreme or black-and-white language? What words would be more accurate?  8. Am I fortune-telling or mind-reading in a way that gets me upset or unhappy? What are the odds or chances that it will really turn out the way I’m thinking or imagining?  9. What are my options in this situation? How would I like to respond?  10. What are more moderate, helpful, or realistic statements to replace the upsetting ones?  11. Have I had any experiences that show that this thought might not be completely true?  12. If my best friend or someone I loved had this thought, what would I tell them?  13. If someone I care about knew I was thinking this thought, what would they say to me?  14. Are there strengths in me or positives in the situation that I am ignoring?  15. When I am not feeling this way, do I think about this situation differently? How?  16. Have I been in this type of situation before? What happened? What have I learned from prior experiences that could help me now?  17. Five years from now, if I look back on this situation, will I look at it any  differently? Will I focus on any different part of my experience?  18. Am I blaming myself for something over which I do not have complete control?      I spent 32 minutes on this date of service performing the following activities: providing counseling and education.

## 2024-01-22 ENCOUNTER — TELEMEDICINE (OUTPATIENT)
Dept: PRIMARY CARE | Facility: CLINIC | Age: 29
End: 2024-01-22
Payer: COMMERCIAL

## 2024-01-22 DIAGNOSIS — F32.A ANXIETY AND DEPRESSION: Primary | ICD-10-CM

## 2024-01-22 DIAGNOSIS — F41.9 ANXIETY AND DEPRESSION: Primary | ICD-10-CM

## 2024-01-22 PROCEDURE — 99213 OFFICE O/P EST LOW 20 MIN: CPT | Mod: 95 | Performed by: STUDENT IN AN ORGANIZED HEALTH CARE EDUCATION/TRAINING PROGRAM

## 2024-01-22 RX ORDER — ESCITALOPRAM OXALATE 20 MG/1
20 TABLET ORAL DAILY
Qty: 30 TABLET | Refills: 0 | Status: SHIPPED | OUTPATIENT
Start: 2024-01-22 | End: 2024-02-22 | Stop reason: SDUPTHER

## 2024-01-22 ASSESSMENT — ENCOUNTER SYMPTOMS
ENDOCRINE NEGATIVE: 1
CONSTITUTIONAL NEGATIVE: 1
HEMATOLOGIC/LYMPHATIC NEGATIVE: 1
CARDIOVASCULAR NEGATIVE: 1
PSYCHIATRIC NEGATIVE: 1
RESPIRATORY NEGATIVE: 1
MUSCULOSKELETAL NEGATIVE: 1
NEUROLOGICAL NEGATIVE: 1
GASTROINTESTINAL NEGATIVE: 1
EYES NEGATIVE: 1
ALLERGIC/IMMUNOLOGIC NEGATIVE: 1

## 2024-01-22 NOTE — PROGRESS NOTES
Verification of Patient Location:  The patient affirms they are currently located in the following state: [place]    Request for Consent:    Audio and Video Encounter   Hello, my name is Ameer Alexandr Davis MD.  Before we proceed, can you please verify your identification by telling me your full name and date of birth?  Can you tell me who is in the room with you?    You and I are about to have a telemedicine check-in or visit because you have requested it.  This is a live video-conference.  I am a real person, speaking to you in real time.  There is no one else with me on the video-conference. I am not recording this conversation and I am asking you not to record it.  This telemedicine visit will be billed to your health insurance or you, if you are self-insured.  You understand you will be responsible for any copayments or coinsurances that apply to your telemedicine visit.  Communication platform used for this encounter:  [employer] Video Visit (Epic Video Client)       Before starting our telemedicine visit, I am required to get your consent for this virtual check-in or visit by telemedicine. Do you consent?      Patient Response to Request for Consent:  Yes      Visit Documentation:  Subjective     Patient ID: Alex Hall is a 28 y.o. male.  1995      HPI    #Anxiety and Depression  -Currently on Lexapro 15 mg once daily  -Patient has been following with his therapist.   -Today patient reports that improvement in his symptoms of anxiety and the way he deals with stress.  He does still feel like there is some room to improve and would like to try the next available dose  -Patient denies side effects related to medication.  -Patient denies SI/HI     The following have been reviewed and updated as appropriate in this visit:        Review of Systems   Constitutional: Negative.    HENT: Negative.    Eyes: Negative.    Respiratory: Negative.    Cardiovascular: Negative.    Gastrointestinal: Negative.     Endocrine: Negative.    Genitourinary: Negative.    Musculoskeletal: Negative.    Skin: Negative.    Allergic/Immunologic: Negative.    Neurological: Negative.    Hematological: Negative.    Psychiatric/Behavioral: Negative.      Assessment/Plan   Diagnoses and all orders for this visit:    Anxiety and depression (Primary)  Assessment & Plan:  Increase Lexapro to 20 mg once daily  Follow-up in 1 month via MyChart  Strict return to office precautions discussed in detail    Orders:  -     escitalopram (LEXAPRO) 20 mg tablet; Take 1 tablet (20 mg total) by mouth daily.      Time Spent:  I spent 13 minutes on this date of service performing the following activities: obtaining history, entering orders, documenting, preparing for visit, obtaining / reviewing records and providing counseling and education.

## 2024-01-22 NOTE — ASSESSMENT & PLAN NOTE
Increase Lexapro to 20 mg once daily  Follow-up in 1 month via MyChart  Strict return to office precautions discussed in detail

## 2024-02-08 ENCOUNTER — OFFICE VISIT (OUTPATIENT)
Dept: BEHAVIORAL HEALTH | Facility: CLINIC | Age: 29
End: 2024-02-08
Payer: COMMERCIAL

## 2024-02-08 DIAGNOSIS — F41.9 ANXIETY DISORDER, UNSPECIFIED TYPE: Primary | ICD-10-CM

## 2024-02-08 PROCEDURE — 90832 PSYTX W PT 30 MINUTES: CPT | Performed by: COUNSELOR

## 2024-02-08 NOTE — PROGRESS NOTES
Integrated Behavioral Health Follow-up Visit Note  Visit number: 5    Visit Type Performed: In-office     Alex Hall presented today for a behavioral health visit.      SUBJECTIVE     Symptoms  Depression symptoms: PHQ 9:  Little Interest or Pleasure in Doing Things: 1-->several days    Feeling Down, Depressed or Hopeless: 0-->not at all    Trouble Falling or Staying Asleep, or Sleeping Too Much: 0-->not at all    Feeling Tired or Having Little Energy: 1-->several days    Poor Appetite or Overeatin-->several days (overeating)    Feeling Bad about Yourself - or that You are a Failure or Have Let Yourself or Your Family Down: 1-->several days    Trouble Concentrating on Things, Such as Reading the Newspaper or Watching Television: 0-->not at all    Moving or Speaking So Slowly that Other People Could Have Noticed? Or the Opposite - Being So Fidgety: 0-->not at all    Thoughts that You Would be Better Off Dead or of Hurting Yourself in Some Way: 0-->not at all    PHQ-9: Brief Depression Severity Measure Score: 4    If You Checked Off Any Problems, How Difficult Have These Problems Made It For You to Do Your Work, Take Care of Things at Home, or Get Along with Other People?: somewhat difficult      Anxiety symptoms: JOHNSON-7  Feeling nervous, anxious or on edge: 2-->More than half the days    Not being able to stop or control worryin-->Several days    Worrying too much about different things: 1-->Several days    Trouble relaxin-->Several days    Being so restless that it is hard to sit still: 0-->Not at all    Becoming easily annoyed or irritable: 1-->Several days    Feeling afraid as if something awful might happen: 1-->Several days      GAD7 Total Score: : 7      If you checked off any problems, how difficult have these made it for you to do your work, take care of things at home, or get along with other people?: Somewhat difficult      OBJECTIVE     Mental Status Evaluation  Patient's mood and affect  were consistent with the context, and consistent with their baseline: Yes   Comments:  calm and pleasant, awake and alert; oriented to person, place, and time      Suicidal Ideation/Homicidal Ideation Risk Assessment: not assessed. If not assessed, reason:  Assessment of SI/HI is not indicated at this time, based on prior assessments (pt has denied SI/HI in all visits with the undersigned provider, pt presents with no significant risk factors, pt does exhibit significant protective factors)    Risk Factors: None reported  Protective factors: Effective and accessible mental health care , Connectedness to individuals, family, community, and social institutions and Problem-solving and conflict resolution skills  Estimate of Current Risk: Minimal risk    Plan for Safety-   N/A:  Risk is assessed to be minimal; therefore, developing a safety plan is not indicated at this time.      Interventions  Cognitive Behavior Therapy   Administered PHQ-9 and JOHNSON-7. Reviewed scores and compared to previous assessment. Discussed progress and pt's thoughts on what has contributed to the changes. Discussed ways to increase utilization of coping strategies.      Psychotropic medications: Noted increased anxiety after increasing Lexapro to 20mg  Current Outpatient Medications   Medication Sig Dispense Refill   • escitalopram (LEXAPRO) 20 mg tablet Take 1 tablet (20 mg total) by mouth daily. 30 tablet 0   • hydrOXYzine (ATARAX) 25 mg tablet TAKE 1 TABLET BY MOUTH 3 TIMES A DAY AS NEEDED FOR ANXIETY. 90 tablet 0     No current facility-administered medications for this visit.       ASSESSMENT       Progress  Patient's progress toward their goals is generally improving  Patient's symptomology is gradually improving   PHQ-9 score increased from 2 to 4 and JOHNSON-7 score decreased from 13 to 7 since initial assessment at intake. Alex reports that the combination of the medication, questioning his anxious thoughts, and grounding have  contributed to his improved anxiety. He had a work conference last week and was surprised by how well-managed his anxiety felt. Reports he has been mulling less on intrusive thoughts. He would like to dedicate more time to practicing relaxation strategies.       PLAN     Goals:  -To not get down during the work day  -Improve anxiety management and learn coping strategies   -Improve management of intrusive thoughts    Recommendations  Individual Therapy, 30 minutes 1-2 times monthly    Next visit plan:  Review pt progress, review pt homework, continue discussion of cognitive restructuring and relaxation techniques       Patient Instructions:  -During works breaks, do a relaxation strategy (e.g. deep breathing, grounding, progressive muscle relaxation) before reaching for your phone.       I spent 26 minutes on this date of service performing the following activities: providing counseling and education.

## 2024-02-21 ENCOUNTER — APPOINTMENT (OUTPATIENT)
Age: 29
Setting detail: DERMATOLOGY
End: 2024-02-22

## 2024-02-21 DIAGNOSIS — D49.2 NEOPLASM OF UNSPECIFIED BEHAVIOR OF BONE, SOFT TISSUE, AND SKIN: ICD-10-CM

## 2024-02-21 DIAGNOSIS — L81.4 OTHER MELANIN HYPERPIGMENTATION: ICD-10-CM

## 2024-02-21 DIAGNOSIS — D22 MELANOCYTIC NEVI: ICD-10-CM

## 2024-02-21 DIAGNOSIS — L57.8 OTHER SKIN CHANGES DUE TO CHRONIC EXPOSURE TO NONIONIZING RADIATION: ICD-10-CM

## 2024-02-21 DIAGNOSIS — D18.0 HEMANGIOMA: ICD-10-CM

## 2024-02-21 DIAGNOSIS — L82.1 OTHER SEBORRHEIC KERATOSIS: ICD-10-CM

## 2024-02-21 PROBLEM — D18.01 HEMANGIOMA OF SKIN AND SUBCUTANEOUS TISSUE: Status: ACTIVE | Noted: 2024-02-21

## 2024-02-21 PROBLEM — D22.5 MELANOCYTIC NEVI OF TRUNK: Status: ACTIVE | Noted: 2024-02-21

## 2024-02-21 PROCEDURE — OTHER BIOPSY BY SHAVE METHOD: OTHER

## 2024-02-21 PROCEDURE — 11102 TANGNTL BX SKIN SINGLE LES: CPT

## 2024-02-21 PROCEDURE — OTHER COUNSELING: OTHER

## 2024-02-21 PROCEDURE — 99213 OFFICE O/P EST LOW 20 MIN: CPT | Mod: 25

## 2024-02-21 PROCEDURE — OTHER SUNSCREEN RECOMMENDATIONS: OTHER

## 2024-02-21 PROCEDURE — 11103 TANGNTL BX SKIN EA SEP/ADDL: CPT

## 2024-02-21 ASSESSMENT — LOCATION DETAILED DESCRIPTION DERM
LOCATION DETAILED: LOWER STERNUM
LOCATION DETAILED: MIDDLE STERNUM
LOCATION DETAILED: RIGHT INFERIOR UPPER BACK
LOCATION DETAILED: RIGHT MID-UPPER BACK
LOCATION DETAILED: XIPHOID
LOCATION DETAILED: LEFT SUPERIOR FOREHEAD
LOCATION DETAILED: LEFT OCCIPITAL SCALP

## 2024-02-21 ASSESSMENT — LOCATION SIMPLE DESCRIPTION DERM
LOCATION SIMPLE: LEFT FOREHEAD
LOCATION SIMPLE: POSTERIOR SCALP
LOCATION SIMPLE: CHEST
LOCATION SIMPLE: RIGHT UPPER BACK
LOCATION SIMPLE: ABDOMEN

## 2024-02-21 ASSESSMENT — LOCATION ZONE DERM
LOCATION ZONE: FACE
LOCATION ZONE: TRUNK
LOCATION ZONE: SCALP

## 2024-02-21 NOTE — PROCEDURE: BIOPSY BY SHAVE METHOD
Detail Level: Detailed
Depth Of Biopsy: dermis
Was A Bandage Applied: Yes
Size Of Lesion In Cm: 0
Biopsy Type: H and E
Biopsy Method: Personna blade
Anesthesia Type: 1% lidocaine without epinephrine
Anesthesia Volume In Cc: 0.5
Hemostasis: Electrocautery and Aluminum Chloride
Wound Care: Petrolatum
Dressing: bandage
Destruction After The Procedure: No
Type Of Destruction Used: Curettage
Curettage Text: The wound bed was treated with curettage after the biopsy was performed.
Cryotherapy Text: The wound bed was treated with cryotherapy after the biopsy was performed.
Electrodesiccation Text: The wound bed was treated with electrodesiccation after the biopsy was performed.
Electrodesiccation And Curettage Text: The wound bed was treated with electrodesiccation and curettage after the biopsy was performed.
Silver Nitrate Text: The wound bed was treated with silver nitrate after the biopsy was performed.
Lab: -[id number]
Consent: Written consent was obtained and risks were reviewed including but not limited to scarring, infection, bleeding, scabbing, incomplete removal, nerve damage and allergy to anesthesia.
Post-Care Instructions: I reviewed with the patient in detail post-care instructions. Patient is to keep the biopsy site dry overnight.  Cleanse daily with soap and water and then apply Petrolatum and cover with a bandage for 3-5 days.
Notification Instructions: Patient will be notified of biopsy results. However, patient instructed to call the office if not contacted within 2 weeks.
Billing Type: Third-Party Bill
Information: Selecting Yes will display possible errors in your note based on the variables you have selected. This validation is only offered as a suggestion for you. PLEASE NOTE THAT THE VALIDATION TEXT WILL BE REMOVED WHEN YOU FINALIZE YOUR NOTE. IF YOU WANT TO FAX A PRELIMINARY NOTE YOU WILL NEED TO TOGGLE THIS TO 'NO' IF YOU DO NOT WANT IT IN YOUR FAXED NOTE.
Hemostasis: Drysol

## 2024-02-22 ENCOUNTER — TELEMEDICINE (OUTPATIENT)
Dept: PRIMARY CARE | Facility: CLINIC | Age: 29
End: 2024-02-22
Payer: COMMERCIAL

## 2024-02-22 ENCOUNTER — OFFICE VISIT (OUTPATIENT)
Dept: BEHAVIORAL HEALTH | Facility: CLINIC | Age: 29
End: 2024-02-22
Payer: COMMERCIAL

## 2024-02-22 DIAGNOSIS — F41.9 ANXIETY AND DEPRESSION: Primary | ICD-10-CM

## 2024-02-22 DIAGNOSIS — F41.9 ANXIETY DISORDER, UNSPECIFIED TYPE: Primary | ICD-10-CM

## 2024-02-22 DIAGNOSIS — F32.A ANXIETY AND DEPRESSION: Primary | ICD-10-CM

## 2024-02-22 PROCEDURE — 99213 OFFICE O/P EST LOW 20 MIN: CPT | Mod: 95 | Performed by: STUDENT IN AN ORGANIZED HEALTH CARE EDUCATION/TRAINING PROGRAM

## 2024-02-22 PROCEDURE — 90832 PSYTX W PT 30 MINUTES: CPT | Performed by: COUNSELOR

## 2024-02-22 RX ORDER — ESCITALOPRAM OXALATE 20 MG/1
20 TABLET ORAL DAILY
Qty: 90 TABLET | Refills: 0 | Status: SHIPPED | OUTPATIENT
Start: 2024-02-22 | End: 2024-05-20 | Stop reason: SDUPTHER

## 2024-02-22 ASSESSMENT — ENCOUNTER SYMPTOMS
GASTROINTESTINAL NEGATIVE: 1
NEUROLOGICAL NEGATIVE: 1
EYES NEGATIVE: 1
RESPIRATORY NEGATIVE: 1
HEMATOLOGIC/LYMPHATIC NEGATIVE: 1
MUSCULOSKELETAL NEGATIVE: 1
ENDOCRINE NEGATIVE: 1
CONSTITUTIONAL NEGATIVE: 1
PSYCHIATRIC NEGATIVE: 1
CARDIOVASCULAR NEGATIVE: 1
ALLERGIC/IMMUNOLOGIC NEGATIVE: 1

## 2024-02-22 NOTE — PROGRESS NOTES
Verification of Patient Location:  The patient affirms they are currently located in the following state: [place]    Request for Consent:    Audio and Video Encounter   Hello, my name is Ameer Alexandr Davis MD.  Before we proceed, can you please verify your identification by telling me your full name and date of birth?  Can you tell me who is in the room with you?    You and I are about to have a telemedicine check-in or visit because you have requested it.  This is a live video-conference.  I am a real person, speaking to you in real time.  There is no one else with me on the video-conference. I am not recording this conversation and I am asking you not to record it.  This telemedicine visit will be billed to your health insurance or you, if you are self-insured.  You understand you will be responsible for any copayments or coinsurances that apply to your telemedicine visit.  Communication platform used for this encounter:  [employer] Video Visit (Epic Video Client)       Before starting our telemedicine visit, I am required to get your consent for this virtual check-in or visit by telemedicine. Do you consent?      Patient Response to Request for Consent:  Yes      Visit Documentation:  Subjective     Patient ID: Alex Hall is a 28 y.o. male.  1995      HPI     #Anxiety and Depression  -Currently on Lexapro 20 mg once daily  -Patient has been following with his therapist.   -Today patient reports that improvement in his symptoms of anxiety and the way he deals with stress.    -Patient denies side effects related to medication.  -Patient denies SI/HI     The following have been reviewed and updated as appropriate in this visit:        Review of Systems   Constitutional: Negative.    HENT: Negative.    Eyes: Negative.    Respiratory: Negative.    Cardiovascular: Negative.    Gastrointestinal: Negative.    Endocrine: Negative.    Genitourinary: Negative.    Musculoskeletal: Negative.    Skin: Negative.     Allergic/Immunologic: Negative.    Neurological: Negative.    Hematological: Negative.    Psychiatric/Behavioral: Negative.      Assessment/Plan   Diagnoses and all orders for this visit:    Anxiety and depression (Primary)  Assessment & Plan:  Continue Lexapro 20 mg daily   Follow up as needed   Strict return to office precautions discussed in detail        Time Spent:  I spent 12 minutes on this date of service performing the following activities: obtaining history, entering orders, documenting, preparing for visit, obtaining / reviewing records and providing counseling and education.

## 2024-02-22 NOTE — ASSESSMENT & PLAN NOTE
Continue Lexapro 20 mg daily   Follow up as needed   Strict return to office precautions discussed in detail

## 2024-02-22 NOTE — PROGRESS NOTES
Integrated Behavioral Health Follow-up Visit Note  Visit number: 6    Visit Type Performed: In-office     Alex Hall presented today for a behavioral health visit.      SUBJECTIVE     Symptoms  Depression symptoms: none reported  Anxiety symptoms: mild anxiety/worry    OBJECTIVE     Mental Status Evaluation  Patient's mood and affect were consistent with the context, and consistent with their baseline: Yes   Comments:  calm and pleasant, awake and alert; oriented to person, place, and time      Suicidal Ideation/Homicidal Ideation Risk Assessment: not assessed. If not assessed, reason:  Assessment of SI/HI is not indicated at this time, based on prior assessments (pt has denied SI/HI in all visits with the undersigned provider, pt presents with no significant risk factors, pt does exhibit significant protective factors)    Risk Factors: None reported  Protective factors: Effective and accessible mental health care , Connectedness to individuals, family, community, and social institutions and Problem-solving and conflict resolution skills  Estimate of Current Risk: Minimal risk    Plan for Safety-   N/A:  Risk is assessed to be minimal; therefore, developing a safety plan is not indicated at this time.      Interventions  Cognitive Behavior Therapy and Goal Setting   Discussed potential side effect (vivid dreams) of the Lexapro and how he has been managing them. Reviewed nail-biting prevention strategies he has tried in the past. Introduced habit reversal strategies and stimulus control. Encouraged him to utilize SMART goal format in setting nail-biting reduction goals. Discussed progress       Psychotropic medications: Noted increased anxiety after increasing Lexapro to 20mg  Current Outpatient Medications   Medication Sig Dispense Refill   • escitalopram (LEXAPRO) 20 mg tablet Take 1 tablet (20 mg total) by mouth daily. 30 tablet 0   • hydrOXYzine (ATARAX) 25 mg tablet TAKE 1 TABLET BY MOUTH 3 TIMES A DAY AS  NEEDED FOR ANXIETY. 90 tablet 0     No current facility-administered medications for this visit.       ASSESSMENT       Progress  Patient's progress toward their goals is generally improving  Patient's symptomology is gradually improving   Alex reports his anxiety and stress levels have been low. He has been experiencing more vivid dreams since being in the Lexapro. This week he had a dream related to his intrusive thoughts. He feels like this typically would have really bothered him, but that he was able to stay rational and move on with his day. He would like to reduce his nail-biting. He submitted work to present in [place] which he is excited about.      PLAN     Goals:  -To not get down during the work day  -Improve anxiety management and learn coping strategies   -Improve management of intrusive thoughts    Recommendations  Individual Therapy, 30 minutes 1-2 times monthly    Next visit plan:  Review pt progress, review pt homework, continue discussion of cognitive restructuring and relaxation techniques       Patient Instructions:  -Time-sensitive  -Alternate behaviors: clicking a pen, fidget, doodling, coloring  -Opposing behaviors: holding your hands  -Track each incident      I spent 30 minutes on this date of service performing the following activities: providing counseling and education.

## 2024-02-28 ENCOUNTER — HOSPITAL ENCOUNTER (OUTPATIENT)
Facility: CLINIC | Age: 29
Discharge: HOME | End: 2024-02-28
Attending: FAMILY MEDICINE
Payer: COMMERCIAL

## 2024-02-28 VITALS
DIASTOLIC BLOOD PRESSURE: 83 MMHG | TEMPERATURE: 98.5 F | SYSTOLIC BLOOD PRESSURE: 146 MMHG | HEART RATE: 77 BPM | OXYGEN SATURATION: 98 %

## 2024-02-28 DIAGNOSIS — S61.211A LACERATION OF LEFT INDEX FINGER WITHOUT FOREIGN BODY WITHOUT DAMAGE TO NAIL, INITIAL ENCOUNTER: Primary | ICD-10-CM

## 2024-02-28 PROCEDURE — S9083 URGENT CARE CENTER GLOBAL: HCPCS | Performed by: FAMILY MEDICINE

## 2024-02-28 PROCEDURE — 99214 OFFICE O/P EST MOD 30 MIN: CPT | Performed by: FAMILY MEDICINE

## 2024-03-21 ENCOUNTER — OFFICE VISIT (OUTPATIENT)
Dept: BEHAVIORAL HEALTH | Facility: CLINIC | Age: 29
End: 2024-03-21
Payer: COMMERCIAL

## 2024-03-21 DIAGNOSIS — F41.9 ANXIETY DISORDER, UNSPECIFIED TYPE: Primary | ICD-10-CM

## 2024-03-21 PROCEDURE — 90832 PSYTX W PT 30 MINUTES: CPT | Performed by: COUNSELOR

## 2024-03-21 NOTE — PROGRESS NOTES
Integrated Behavioral Health Follow-up Visit Note  Visit number: 7    Visit Type Performed: In-office     Alex Hall presented today for a behavioral health visit.      SUBJECTIVE     Symptoms  Depression symptoms: PHQ 9:  Little Interest or Pleasure in Doing Things: 0-->not at all    Feeling Down, Depressed or Hopeless: 0-->not at all    Trouble Falling or Staying Asleep, or Sleeping Too Much: 0-->not at all    Feeling Tired or Having Little Energy: 0-->not at all    Poor Appetite or Overeatin-->not at all    Feeling Bad about Yourself - or that You are a Failure or Have Let Yourself or Your Family Down: 1-->several days    Trouble Concentrating on Things, Such as Reading the Newspaper or Watching Television: 0-->not at all    Moving or Speaking So Slowly that Other People Could Have Noticed? Or the Opposite - Being So Fidgety: 0-->not at all    Thoughts that You Would be Better Off Dead or of Hurting Yourself in Some Way: 0-->not at all    PHQ-9: Brief Depression Severity Measure Score: 1    If You Checked Off Any Problems, How Difficult Have These Problems Made It For You to Do Your Work, Take Care of Things at Home, or Get Along with Other People?: somewhat difficult      Anxiety symptoms: JOHNSON-7  Feeling nervous, anxious or on edge: 1-->Several days    Not being able to stop or control worryin-->Not at all    Worrying too much about different things: 0-->Not at all    Trouble relaxin-->Not at all    Being so restless that it is hard to sit still: 1-->Several days    Becoming easily annoyed or irritable: 1-->Several days    Feeling afraid as if something awful might happen: 0-->Not at all      GAD7 Total Score: : 3      If you checked off any problems, how difficult have these made it for you to do your work, take care of things at home, or get along with other people?: Somewhat difficult      OBJECTIVE     Mental Status Evaluation  Patient's mood and affect were consistent with the context,  "and consistent with their baseline: Yes   Comments:  calm and pleasant, awake and alert; oriented to person, place, and time      Suicidal Ideation/Homicidal Ideation Risk Assessment: not assessed. If not assessed, reason:  Assessment of SI/HI is not indicated at this time, based on prior assessments (pt has denied SI/HI in all visits with the undersigned provider, pt presents with no significant risk factors, pt does exhibit significant protective factors)    Risk Factors: None reported  Protective factors: Effective and accessible mental health care , Connectedness to individuals, family, community, and social institutions and Problem-solving and conflict resolution skills  Estimate of Current Risk: Minimal risk    Plan for Safety-   N/A:  Risk is assessed to be minimal; therefore, developing a safety plan is not indicated at this time.      Interventions  Cognitive Behavior Therapy and Goal Setting   Discussed progress with his goal to decrease nail-biting and celebrated his success. Discussed recent anxiety levels and what he attributes his progress to. Assisted pt in setting goals around getting out of bed earlier on weekday mornings.      Psychotropic medications: No known adherence issues, effective  Current Outpatient Medications   Medication Sig Dispense Refill   • escitalopram (LEXAPRO) 20 mg tablet Take 1 tablet (20 mg total) by mouth daily. 90 tablet 0     No current facility-administered medications for this visit.       ASSESSMENT       Progress  Patient's progress toward their goals is generally improving  Patient's symptomology is gradually improving   PHQ-9 score decreased from 4 to 1 and JOHNSON-7 score dropped from 7 to 3 since last assessment.  Alex reports he has stopped biting his nails which he is excited about. He reports he gets anxious about work related issues but is able to \"look past\" the anxious moments and not get paralyzed by them.      PLAN     Goals:  -To not get down during the work " day  -Improve anxiety management and learn coping strategies   -Improve management of intrusive thoughts    Recommendations  Individual Therapy, 30 minutes 1-2 times monthly    Next visit plan:  Review pt progress, continue discussion of cognitive restructuring and behavior change goals      Patient Instructions:  -Wake up an hour earlier a few days/week      I spent 30 minutes on this date of service performing the following activities: providing counseling and education.

## 2024-05-02 ENCOUNTER — OFFICE VISIT (OUTPATIENT)
Dept: BEHAVIORAL HEALTH | Facility: CLINIC | Age: 29
End: 2024-05-02
Payer: COMMERCIAL

## 2024-05-02 DIAGNOSIS — F41.9 ANXIETY DISORDER, UNSPECIFIED TYPE: Primary | ICD-10-CM

## 2024-05-02 PROCEDURE — 90832 PSYTX W PT 30 MINUTES: CPT | Performed by: COUNSELOR

## 2024-05-02 NOTE — PROGRESS NOTES
Integrated Behavioral Health Follow-up Visit Note  Visit number: 8    Visit Type Performed: In-office     Alex Hall presented today for a behavioral health visit.      SUBJECTIVE     Symptoms  Depression symptoms: PHQ 9:  Little Interest or Pleasure in Doing Things: 0-->not at all      Feeling Down, Depressed or Hopeless: 0-->not at all      Trouble Falling or Staying Asleep, or Sleeping Too Much: 0-->not at all      Feeling Tired or Having Little Energy: 0-->not at all      Poor Appetite or Overeatin-->not at all      Feeling Bad about Yourself - or that You are a Failure or Have Let Yourself or Your Family Down: 0-->not at all      Trouble Concentrating on Things, Such as Reading the Newspaper or Watching Television: 0-->not at all      Moving or Speaking So Slowly that Other People Could Have Noticed? Or the Opposite - Being So Fidgety: 0-->not at all      Thoughts that You Would be Better Off Dead or of Hurting Yourself in Some Way: 0-->not at all      PHQ-9: Brief Depression Severity Measure Score: 0      If You Checked Off Any Problems, How Difficult Have These Problems Made It For You to Do Your Work, Take Care of Things at Home, or Get Along with Other People?: not difficult at all        Anxiety symptoms: JOHNSON-7  Feeling nervous, anxious or on edge: 1-->Several days      Not being able to stop or control worryin-->Not at all      Worrying too much about different things: 0-->Not at all      Trouble relaxin-->Not at all      Being so restless that it is hard to sit still: 1-->Several days      Becoming easily annoyed or irritable: 0-->Not at all      Feeling afraid as if something awful might happen: 0-->Not at all        GAD7 Total Score: : 2        If you checked off any problems, how difficult have these made it for you to do your work, take care of things at home, or get along with other people?: Somewhat difficult        OBJECTIVE     Mental Status Evaluation  Patient's mood and  affect were consistent with the context, and consistent with their baseline: Yes   Comments:  calm and pleasant, awake and alert; oriented to person, place, and time      Suicidal Ideation/Homicidal Ideation Risk Assessment: not assessed. If not assessed, reason:  Assessment of SI/HI is not indicated at this time, based on prior assessments (pt has denied SI/HI in all visits with the undersigned provider, pt presents with no significant risk factors, pt does exhibit significant protective factors)    Risk Factors: None reported  Protective factors: Effective and accessible mental health care , Connectedness to individuals, family, community, and social institutions and Problem-solving and conflict resolution skills  Estimate of Current Risk: Minimal risk    Plan for Safety-   N/A:  Risk is assessed to be minimal; therefore, developing a safety plan is not indicated at this time.      Interventions  Cognitive Behavior Therapy and Goal Setting   Discussed discharge timeline and plan. Discussed progress with his goals to decrease nail-biting and get out of bed earlier in the morning. Encouraged his improvement. Discussed recent anxiety levels. Set goals for next session.    Psychotropic medications: No known adherence issues, effective  Current Outpatient Medications   Medication Sig Dispense Refill    escitalopram (LEXAPRO) 20 mg tablet Take 1 tablet (20 mg total) by mouth daily. 90 tablet 0     No current facility-administered medications for this visit.       ASSESSMENT       Progress  Patient's progress toward their goals is generally improving  Patient's symptomology is well controlled   PHQ-9 score decreased from 1 to 0 and JOHNSON-7 score dropped from 3 to 2 since last assessment.  Alex reports he has continued to bit his nails less. He joined a softball team with people he does not know well. He has started getting out of bed earlier and going to the gym before work. Stress is high at work but he feels like he  is able to manage it. He has intrusive thoughts but they don't stick with him.      PLAN     Goals:  -To not get down during the work day  -Improve anxiety management and learn coping strategies   -Improve management of intrusive thoughts    Recommendations  Individual Therapy, 30 minutes 1-2 times monthly    Next visit plan:  Review pt progress, continue discussion of cognitive restructuring and behavior change goals      I spent 28 minutes on this date of service performing the following activities: providing counseling and education.    Patient Instructions:  -[facility]  [phone]  -[facility] [phone]   -[facility] [phone]   -  [phone]    -[email or web address] have profiles of different therapists

## 2024-05-20 DIAGNOSIS — F41.9 ANXIETY AND DEPRESSION: ICD-10-CM

## 2024-05-20 DIAGNOSIS — F32.A ANXIETY AND DEPRESSION: ICD-10-CM

## 2024-05-24 RX ORDER — ESCITALOPRAM OXALATE 20 MG/1
20 TABLET ORAL DAILY
Qty: 90 TABLET | Refills: 0 | Status: SHIPPED | OUTPATIENT
Start: 2024-05-24 | End: 2024-08-12 | Stop reason: SDUPTHER

## 2024-06-12 NOTE — PROGRESS NOTES
Integrated Behavioral Health Follow-up Visit Note  Visit number: 9    Visit Type Performed: In-office     Alex Hall presented today for a behavioral health visit.      SUBJECTIVE     Symptoms  Depression symptoms: PHQ 9:  Little Interest or Pleasure in Doing Things: 0-->not at all        Feeling Down, Depressed or Hopeless: 0-->not at all        Trouble Falling or Staying Asleep, or Sleeping Too Much: 0-->not at all        Feeling Tired or Having Little Energy: 1-->several days        Poor Appetite or Overeatin-->not at all        Feeling Bad about Yourself - or that You are a Failure or Have Let Yourself or Your Family Down: 1-->several days        Trouble Concentrating on Things, Such as Reading the Newspaper or Watching Television: 0-->not at all        Moving or Speaking So Slowly that Other People Could Have Noticed? Or the Opposite - Being So Fidgety: 0-->not at all        Thoughts that You Would be Better Off Dead or of Hurting Yourself in Some Way: 0-->not at all        PHQ-9: Brief Depression Severity Measure Score: 2        If You Checked Off Any Problems, How Difficult Have These Problems Made It For You to Do Your Work, Take Care of Things at Home, or Get Along with Other People?: somewhat difficult          Anxiety symptoms: JOHNSON-7  Feeling nervous, anxious or on edge: 1-->Several days        Not being able to stop or control worryin-->Not at all        Worrying too much about different things: 1-->Several days        Trouble relaxin-->Not at all        Being so restless that it is hard to sit still: 0-->Not at all        Becoming easily annoyed or irritable: 0-->Not at all        Feeling afraid as if something awful might happen: 0-->Not at all          GAD7 Total Score: : 2          If you checked off any problems, how difficult have these made it for you to do your work, take care of things at home, or get along with other people?: Somewhat difficult      OBJECTIVE     Mental  Status Evaluation  Patient's mood and affect were consistent with the context, and consistent with their baseline: Yes   Comments:  calm and pleasant, awake and alert; oriented to person, place, and time      Suicidal Ideation/Homicidal Ideation Risk Assessment: not assessed. If not assessed, reason:  Assessment of SI/HI is not indicated at this time, based on prior assessments (pt has denied SI/HI in all visits with the undersigned provider, pt presents with no significant risk factors, pt does exhibit significant protective factors)    Risk Factors: None reported  Protective factors: Effective and accessible mental health care , Connectedness to individuals, family, community, and social institutions and Problem-solving and conflict resolution skills  Estimate of Current Risk: Minimal risk    Plan for Safety-   N/A:  Risk is assessed to be minimal; therefore, developing a safety plan is not indicated at this time.      Interventions  Cognitive Behavior Therapy and Goal Setting   Explored his increased anxiety related to playing softball.  Assisted pt in identifying his negative automatic beliefs. Explained the impact of avoidance. Normalized anxiety as a healthy and important emotions. Provided psychoeducation of the [place] law. Discussed his progress with limiting his nail biting and helped problem solve around perceived barriers. Reviewed discharge timeline and plan.      Psychotropic medications: No known adherence issues, effective  Current Outpatient Medications   Medication Sig Dispense Refill    escitalopram (LEXAPRO) 20 mg tablet Take 1 tablet (20 mg total) by mouth daily. 90 tablet 0     No current facility-administered medications for this visit.       ASSESSMENT       Progress  Patient's progress toward their goals is generally improving  Patient's symptomology is well controlled   PHQ-9 score increased from 0 to 2 and JOHNSON-7 score remained at 2 since last assessment.  Alex reports he is doing  well but has noticed an increase in stress related to work and joining a softball league. He has continued to limit his nail biting.       PLAN     Goals:  -To not get down during the work day  -Improve anxiety management and learn coping strategies   -Improve management of intrusive thoughts    Recommendations  Individual Therapy, 30 minutes 1-2 times monthly    Next visit plan:  Review pt progress, continue discussion of cognitive restructuring and behavior change goals      I spent 32 minutes on this date of service performing the following activities: providing counseling and education.    Patient Instructions:  -[facility] [phone]   -[facility] [phone]   -  [phone]    -[facility] [phone]   -[email or web address] and [email or web address] have profiles of different therapists

## 2024-06-13 ENCOUNTER — OFFICE VISIT (OUTPATIENT)
Dept: BEHAVIORAL HEALTH | Facility: CLINIC | Age: 29
End: 2024-06-13
Payer: COMMERCIAL

## 2024-06-13 DIAGNOSIS — F41.9 ANXIETY DISORDER, UNSPECIFIED TYPE: Primary | ICD-10-CM

## 2024-06-13 PROCEDURE — 90832 PSYTX W PT 30 MINUTES: CPT | Performed by: COUNSELOR

## 2024-07-25 ENCOUNTER — OFFICE VISIT (OUTPATIENT)
Dept: BEHAVIORAL HEALTH | Facility: CLINIC | Age: 29
End: 2024-07-25
Payer: COMMERCIAL

## 2024-07-25 DIAGNOSIS — F41.9 ANXIETY DISORDER, UNSPECIFIED TYPE: Primary | ICD-10-CM

## 2024-07-25 PROCEDURE — 90832 PSYTX W PT 30 MINUTES: CPT | Performed by: COUNSELOR

## 2024-07-25 NOTE — PROGRESS NOTES
Integrated Behavioral Health Follow-up Visit Note  Visit number: 10    Visit Type Performed: In-office     Alex Hall presented today for a behavioral health visit.      SUBJECTIVE     Symptoms  Depression symptoms: PHQ 9:  No data recorded        No data recorded        No data recorded        No data recorded        No data recorded        No data recorded        No data recorded        No data recorded        No data recorded        No data recorded        No data recorded          Anxiety symptoms: JOHNSON-7  No data recorded        No data recorded        No data recorded        No data recorded        No data recorded        No data recorded        No data recorded          No data recorded          No data recorded      OBJECTIVE     Mental Status Evaluation  Patient's mood and affect were consistent with the context, and consistent with their baseline: Yes   Comments:  calm and pleasant, awake and alert; oriented to person, place, and time      Suicidal Ideation/Homicidal Ideation Risk Assessment: not assessed. If not assessed, reason:  Assessment of SI/HI is not indicated at this time, based on prior assessments (pt has denied SI/HI in all visits with the undersigned provider, pt presents with no significant risk factors, pt does exhibit significant protective factors)    Risk Factors: None reported  Protective factors: Effective and accessible mental health care , Connectedness to individuals, family, community, and social institutions and Problem-solving and conflict resolution skills  Estimate of Current Risk: Minimal risk    Plan for Safety-   N/A:  Risk is assessed to be minimal; therefore, developing a safety plan is not indicated at this time.      Interventions  Cognitive Behavior Therapy and Goal Setting   We discussed his past few weeks and his management of his anxiety. Processed the increased nail-biting while on vacation and discussed strategies to better manage it on his upcoming trip.  Explored the progress he has made over the course of treatment. Affirmed the positive changes he has observed. Reviewed discharge timeline and plan.      Psychotropic medications: No known adherence issues, effective  Current Outpatient Medications   Medication Sig Dispense Refill    escitalopram (LEXAPRO) 20 mg tablet Take 1 tablet (20 mg total) by mouth daily. 90 tablet 0     No current facility-administered medications for this visit.       ASSESSMENT       Progress  Patient's progress toward their goals is generally improving   Patient's symptomology is well controlled   PHQ-9 score decreased from 2 to 1 and JOHNSON-7 score remained at 2 since last assessment.  Alex reports his anxiety feels well-controlled. He is able to manage the intrusive thoughts. He feels more positive about work and has better work boundaries. He has been following his morning routine. He has seen improvement overall with his nail-biting.       PLAN     Goals:  -To not get down during the work day  -Improve anxiety management and learn coping strategies   -Improve management of intrusive thoughts    Recommendations  Individual Therapy, 30 minutes 1-2 times monthly    Next visit plan:  No further sessions scheduled at this time      I spent 35 minutes on this date of service performing the following activities: providing counseling and education.    Patient Instructions:  [facility]   [email or web address]  [phone]  [email or web address]

## 2024-08-06 ENCOUNTER — TELEPHONE (OUTPATIENT)
Dept: PRIMARY CARE | Facility: CLINIC | Age: 29
End: 2024-08-06
Payer: COMMERCIAL

## 2024-08-06 NOTE — TELEPHONE ENCOUNTER
Pt dropped off Physician Biometric screening form placed in providers bin, and would like it faxed back to [phone]

## 2024-08-12 DIAGNOSIS — F32.A ANXIETY AND DEPRESSION: ICD-10-CM

## 2024-08-12 DIAGNOSIS — F41.9 ANXIETY AND DEPRESSION: ICD-10-CM

## 2024-08-15 RX ORDER — ESCITALOPRAM OXALATE 20 MG/1
20 TABLET ORAL DAILY
Qty: 90 TABLET | Refills: 0 | Status: SHIPPED | OUTPATIENT
Start: 2024-08-15 | End: 2024-11-18 | Stop reason: SDUPTHER

## 2024-08-28 ENCOUNTER — TELEPHONE (OUTPATIENT)
Dept: PRIMARY CARE | Facility: CLINIC | Age: 29
End: 2024-08-28
Payer: COMMERCIAL

## 2024-10-25 ENCOUNTER — OFFICE VISIT (OUTPATIENT)
Dept: PRIMARY CARE | Facility: CLINIC | Age: 29
End: 2024-10-25
Payer: COMMERCIAL

## 2024-10-25 ENCOUNTER — APPOINTMENT (OUTPATIENT)
Dept: LAB | Facility: CLINIC | Age: 29
End: 2024-10-25
Attending: STUDENT IN AN ORGANIZED HEALTH CARE EDUCATION/TRAINING PROGRAM
Payer: COMMERCIAL

## 2024-10-25 VITALS
HEIGHT: 77 IN | WEIGHT: 274 LBS | OXYGEN SATURATION: 98 % | TEMPERATURE: 97.8 F | SYSTOLIC BLOOD PRESSURE: 110 MMHG | HEART RATE: 76 BPM | DIASTOLIC BLOOD PRESSURE: 80 MMHG | BODY MASS INDEX: 32.35 KG/M2 | RESPIRATION RATE: 16 BRPM

## 2024-10-25 DIAGNOSIS — Z00.00 ANNUAL PHYSICAL EXAM: ICD-10-CM

## 2024-10-25 DIAGNOSIS — Z23 NEED FOR INFLUENZA VACCINATION: ICD-10-CM

## 2024-10-25 DIAGNOSIS — Z00.00 ANNUAL PHYSICAL EXAM: Primary | ICD-10-CM

## 2024-10-25 LAB
ALBUMIN SERPL-MCNC: 5 G/DL (ref 3.5–5.7)
ALP SERPL-CCNC: 44 IU/L (ref 34–125)
ALT SERPL-CCNC: 92 IU/L (ref 7–52)
ANION GAP SERPL CALC-SCNC: 10 MEQ/L (ref 3–15)
AST SERPL-CCNC: 73 IU/L (ref 13–39)
BASOPHILS # BLD: 0.03 K/UL (ref 0.01–0.1)
BASOPHILS NFR BLD: 0.5 %
BILIRUB SERPL-MCNC: 1.1 MG/DL (ref 0.3–1.2)
BUN SERPL-MCNC: 11 MG/DL (ref 7–25)
CALCIUM SERPL-MCNC: 10.1 MG/DL (ref 8.6–10.3)
CHLORIDE SERPL-SCNC: 103 MEQ/L (ref 98–107)
CHOLEST SERPL-MCNC: 184 MG/DL
CO2 SERPL-SCNC: 27 MEQ/L (ref 21–31)
CREAT SERPL-MCNC: 0.8 MG/DL (ref 0.7–1.3)
DIFFERENTIAL METHOD BLD: ABNORMAL
EGFRCR SERPLBLD CKD-EPI 2021: >60 ML/MIN/1.73M*2
EOSINOPHIL # BLD: 0.05 K/UL (ref 0.04–0.54)
EOSINOPHIL NFR BLD: 0.8 %
ERYTHROCYTE [DISTWIDTH] IN BLOOD BY AUTOMATED COUNT: 11.9 % (ref 11.6–14.4)
EST. AVERAGE GLUCOSE BLD GHB EST-MCNC: 97 MG/DL
GLUCOSE SERPL-MCNC: 89 MG/DL (ref 70–99)
HBA1C MFR BLD: 5 %
HCT VFR BLD AUTO: 43.9 % (ref 40.1–51)
HDLC SERPL-MCNC: 43 MG/DL
HDLC SERPL: 4.3 {RATIO}
HGB BLD-MCNC: 14.9 G/DL (ref 13.7–17.5)
IMM GRANULOCYTES # BLD AUTO: 0.01 K/UL (ref 0–0.08)
IMM GRANULOCYTES NFR BLD AUTO: 0.2 %
LDLC SERPL CALC-MCNC: 120 MG/DL
LYMPHOCYTES # BLD: 2.15 K/UL (ref 1.2–3.5)
LYMPHOCYTES NFR BLD: 36.4 %
MCH RBC QN AUTO: 31 PG (ref 28–33.2)
MCHC RBC AUTO-ENTMCNC: 33.9 G/DL (ref 32.2–36.5)
MCV RBC AUTO: 91.5 FL (ref 83–98)
MONOCYTES # BLD: 0.49 K/UL (ref 0.3–1)
MONOCYTES NFR BLD: 8.3 %
NEUTROPHILS # BLD: 3.18 K/UL (ref 1.7–7)
NEUTS SEG NFR BLD: 53.8 %
NONHDLC SERPL-MCNC: 141 MG/DL
NRBC BLD-RTO: 0 %
PLATELET # BLD AUTO: 367 K/UL (ref 150–350)
PMV BLD AUTO: 9.5 FL (ref 9.4–12.4)
POTASSIUM SERPL-SCNC: 4.6 MEQ/L (ref 3.5–5.1)
PROT SERPL-MCNC: 7.5 G/DL (ref 6–8.2)
RBC # BLD AUTO: 4.8 M/UL (ref 4.5–5.8)
SODIUM SERPL-SCNC: 140 MEQ/L (ref 136–145)
TRIGL SERPL-MCNC: 105 MG/DL
TSH SERPL DL<=0.05 MIU/L-ACNC: 1.46 MIU/L (ref 0.34–5.6)
WBC # BLD AUTO: 5.91 K/UL (ref 3.8–10.5)

## 2024-10-25 PROCEDURE — 80061 LIPID PANEL: CPT

## 2024-10-25 PROCEDURE — 3008F BODY MASS INDEX DOCD: CPT | Performed by: STUDENT IN AN ORGANIZED HEALTH CARE EDUCATION/TRAINING PROGRAM

## 2024-10-25 PROCEDURE — 85025 COMPLETE CBC W/AUTO DIFF WBC: CPT

## 2024-10-25 PROCEDURE — 36415 COLL VENOUS BLD VENIPUNCTURE: CPT

## 2024-10-25 PROCEDURE — 83036 HEMOGLOBIN GLYCOSYLATED A1C: CPT

## 2024-10-25 PROCEDURE — 90471 IMMUNIZATION ADMIN: CPT | Performed by: STUDENT IN AN ORGANIZED HEALTH CARE EDUCATION/TRAINING PROGRAM

## 2024-10-25 PROCEDURE — 99395 PREV VISIT EST AGE 18-39: CPT | Mod: 25 | Performed by: STUDENT IN AN ORGANIZED HEALTH CARE EDUCATION/TRAINING PROGRAM

## 2024-10-25 PROCEDURE — 84443 ASSAY THYROID STIM HORMONE: CPT

## 2024-10-25 PROCEDURE — 80053 COMPREHEN METABOLIC PANEL: CPT

## 2024-10-25 PROCEDURE — 90656 IIV3 VACC NO PRSV 0.5 ML IM: CPT | Performed by: STUDENT IN AN ORGANIZED HEALTH CARE EDUCATION/TRAINING PROGRAM

## 2024-10-25 ASSESSMENT — ENCOUNTER SYMPTOMS
ALLERGIC/IMMUNOLOGIC NEGATIVE: 1
MUSCULOSKELETAL NEGATIVE: 1
PSYCHIATRIC NEGATIVE: 1
CARDIOVASCULAR NEGATIVE: 1
GASTROINTESTINAL NEGATIVE: 1
RESPIRATORY NEGATIVE: 1
EYES NEGATIVE: 1
ENDOCRINE NEGATIVE: 1
HEMATOLOGIC/LYMPHATIC NEGATIVE: 1
NEUROLOGICAL NEGATIVE: 1
CONSTITUTIONAL NEGATIVE: 1

## 2024-10-25 ASSESSMENT — PATIENT HEALTH QUESTIONNAIRE - PHQ9: SUM OF ALL RESPONSES TO PHQ9 QUESTIONS 1 & 2: 0

## 2024-10-25 NOTE — PROGRESS NOTES
Subjective      Patient ID: Alex Hall is a 29 y.o. male here for the following:   Annual Exam      HPI    #Annual Exam  PMH:    #HLD -.  Not on meds    #Anxiety and depression -on Lexapro 20 mg daily     -Diet: Well balanced diet, Admits could be better on weekends   -Exercise: Exercises regularly, 2-3 x week   -Mood: Good, denies depression but admits Anxiety      Wears lenses/contacts.      Specialist:   -Optometry  -Dermatologist      Due for Flu, COVID (booster) vaccinations  Up to date on all other screening exams/vaccinations      The following have been reviewed and updated as appropriate in this visit:      Allergies  Meds  Problems  Social History      Patient Active Problem List   Diagnosis    Annual physical exam    Anxiety and depression    .    Social History     Tobacco Use    Smoking status: Never    Smokeless tobacco: Never   Vaping Use    Vaping status: Never Used   Substance Use Topics    Alcohol use: Yes     Comment: socially    Drug use: Never       Allergies as of 10/25/2024    (No Known Allergies)         Current Outpatient Medications:     escitalopram (LEXAPRO) 20 mg tablet, Take 1 tablet (20 mg total) by mouth daily., Disp: 90 tablet, Rfl: 0      Review of systems as per HPI and below    PHQ-9 Results  Will the patient answer the depression questions?: Y    Little interest or pleasure in doing things: Not at all    Feeling down, depressed, or hopeless: Not at all    Depression Risk: 0    No data recorded  No data recorded  No data recorded  No data recorded  No data recorded  No data recorded  No data recorded  No data recorded  No data recorded  No data recorded    [place] Suicide Severity Rating Scale  No data recorded  No data recorded  No data recorded  No data recorded  No data recorded  No data recorded  No data recorded           Review of Systems   Constitutional: Negative.    HENT: Negative.     Eyes: Negative.    Respiratory: Negative.     Cardiovascular:  "Negative.    Gastrointestinal: Negative.    Endocrine: Negative.    Genitourinary: Negative.    Musculoskeletal: Negative.    Skin: Negative.    Allergic/Immunologic: Negative.    Neurological: Negative.    Hematological: Negative.    Psychiatric/Behavioral: Negative.       Objective   Vitals:   Vitals:    10/25/24 0834   BP: 110/80   BP Location: Left upper arm   Patient Position: Sitting   Pulse: 76   Resp: 16   Temp: 36.6 °C (97.8 °F)   TempSrc: Temporal   SpO2: 98%   Weight: 124 kg (274 lb)   Height: 1.956 m (6' 5\")     Body mass index is 32.49 kg/m².    Physical Exam  Constitutional:       General: He is not in acute distress.     Appearance: He is not ill-appearing.   HENT:      Head: Normocephalic and atraumatic.      Right Ear: Tympanic membrane, ear canal and external ear normal. There is no impacted cerumen.      Left Ear: Tympanic membrane, ear canal and external ear normal. There is no impacted cerumen.      Nose: Nose normal.      Mouth/Throat:      Mouth: Mucous membranes are moist.   Eyes:      General:         Right eye: No discharge.         Left eye: No discharge.      Extraocular Movements: Extraocular movements intact.      Conjunctiva/sclera: Conjunctivae normal.      Pupils: Pupils are equal, round, and reactive to light.   Cardiovascular:      Rate and Rhythm: Normal rate and regular rhythm.      Pulses: Normal pulses.      Heart sounds: Normal heart sounds. No murmur heard.     No friction rub. No gallop.   Pulmonary:      Effort: Pulmonary effort is normal. No respiratory distress.      Breath sounds: Normal breath sounds. No stridor. No wheezing, rhonchi or rales.   Chest:      Chest wall: No tenderness.   Abdominal:      General: Abdomen is flat. There is no distension.      Palpations: Abdomen is soft.      Tenderness: There is no abdominal tenderness.   Musculoskeletal:         General: Normal range of motion.      Cervical back: Normal range of motion. No rigidity.      Right lower leg: " No edema.      Left lower leg: No edema.   Lymphadenopathy:      Cervical: No cervical adenopathy.   Skin:     General: Skin is warm and dry.      Capillary Refill: Capillary refill takes less than 2 seconds.   Neurological:      General: No focal deficit present.      Mental Status: He is alert and oriented to person, place, and time.      Cranial Nerves: No cranial nerve deficit.      Sensory: No sensory deficit.      Motor: No weakness.      Coordination: Coordination normal.      Gait: Gait normal.   Psychiatric:         Mood and Affect: Mood normal.         Behavior: Behavior normal.         ASSESSMENT & PLAN    Problem List Items Addressed This Visit       Annual physical exam - Primary     See HPI.  VS reviewed    Plan:  -Labs pending (CBC, CMP, TSH, A1c, Lipid)  -Diet and exercise encouraged.  -Flu vaccine administered in office today.  -Not interested in other vaccinations today.   -Return in 1 year for annual exam.           Relevant Orders    Comprehensive metabolic panel    Hemoglobin A1c    CBC and Differential    TSH w reflex FT4    Lipid panel     Other Visit Diagnoses       Need for influenza vaccination        Relevant Orders    Influenza vaccine trivalent 6 mons and older IM (Flulaval) (Completed)            Orders Placed This Encounter   Procedures    Influenza vaccine trivalent 6 mons and older IM (Flulaval)    Comprehensive metabolic panel     Standing Status:   Future     Standing Expiration Date:   10/25/2025     Order Specific Question:   Release to patient     Answer:   Immediate [1]    Hemoglobin A1c     Standing Status:   Future     Standing Expiration Date:   10/25/2025     Order Specific Question:   Release to patient     Answer:   Immediate [1]    CBC and Differential     Standing Status:   Future     Standing Expiration Date:   10/25/2025     Order Specific Question:   Release to patient     Answer:   Immediate [1]    TSH w reflex FT4     Standing Status:   Future     Standing  Expiration Date:   10/25/2025     Order Specific Question:   Release to patient     Answer:   Immediate [1]    Lipid panel     Standing Status:   Future     Standing Expiration Date:   10/25/2025     Order Specific Question:   Release to patient     Answer:   Immediate [1]           _____________________  Irais Davis MD  10/25/24

## 2024-10-25 NOTE — ASSESSMENT & PLAN NOTE
See HPI.  VS reviewed    Plan:  -Labs pending (CBC, CMP, TSH, A1c, Lipid)  -Diet and exercise encouraged.  -Flu vaccine administered in office today.  -Not interested in other vaccinations today.   -Return in 1 year for annual exam.

## 2024-10-25 NOTE — PATIENT INSTRUCTIONS
One of the labs ordered is a fasting lab meaning no food for at least 10 hours before you go to the lab. You can continue to have water and its ok to have black coffee but NO creamer, milk, or sugar as they can affect the results of the tests.      Debrox for ear cerumen

## 2024-11-11 DIAGNOSIS — R79.89 ELEVATED LFTS: Primary | ICD-10-CM

## 2024-11-18 DIAGNOSIS — F41.9 ANXIETY AND DEPRESSION: ICD-10-CM

## 2024-11-18 DIAGNOSIS — F32.A ANXIETY AND DEPRESSION: ICD-10-CM

## 2024-11-18 RX ORDER — ESCITALOPRAM OXALATE 20 MG/1
20 TABLET ORAL DAILY
Qty: 90 TABLET | Refills: 3 | Status: SHIPPED | OUTPATIENT
Start: 2024-11-18 | End: 2025-02-11 | Stop reason: SDUPTHER

## 2025-01-13 DIAGNOSIS — Z29.89 NEED FOR MALARIA PROPHYLAXIS: Primary | ICD-10-CM

## 2025-01-13 RX ORDER — DOXYCYCLINE HYCLATE 100 MG
100 TABLET ORAL DAILY
Qty: 35 TABLET | Refills: 0 | Status: SHIPPED | OUTPATIENT
Start: 2025-01-13 | End: 2025-02-17

## 2025-02-11 DIAGNOSIS — F41.9 ANXIETY AND DEPRESSION: ICD-10-CM

## 2025-02-11 DIAGNOSIS — F32.A ANXIETY AND DEPRESSION: ICD-10-CM

## 2025-02-11 RX ORDER — ESCITALOPRAM OXALATE 20 MG/1
20 TABLET ORAL DAILY
Qty: 90 TABLET | Refills: 3 | Status: SHIPPED | OUTPATIENT
Start: 2025-02-11

## 2025-02-16 DIAGNOSIS — Z29.89 NEED FOR MALARIA PROPHYLAXIS: ICD-10-CM

## 2025-02-17 RX ORDER — DOXYCYCLINE HYCLATE 100 MG
100 TABLET ORAL DAILY
Qty: 30 TABLET | Refills: 1 | OUTPATIENT
Start: 2025-02-17

## 2025-02-21 ENCOUNTER — APPOINTMENT (OUTPATIENT)
Age: 30
Setting detail: DERMATOLOGY
End: 2025-02-21

## 2025-02-21 DIAGNOSIS — D18.0 HEMANGIOMA: ICD-10-CM

## 2025-02-21 DIAGNOSIS — L57.8 OTHER SKIN CHANGES DUE TO CHRONIC EXPOSURE TO NONIONIZING RADIATION: ICD-10-CM

## 2025-02-21 DIAGNOSIS — L81.4 OTHER MELANIN HYPERPIGMENTATION: ICD-10-CM

## 2025-02-21 DIAGNOSIS — L82.1 OTHER SEBORRHEIC KERATOSIS: ICD-10-CM

## 2025-02-21 DIAGNOSIS — D22 MELANOCYTIC NEVI: ICD-10-CM

## 2025-02-21 PROBLEM — D22.5 MELANOCYTIC NEVI OF TRUNK: Status: ACTIVE | Noted: 2025-02-21

## 2025-02-21 PROBLEM — D18.01 HEMANGIOMA OF SKIN AND SUBCUTANEOUS TISSUE: Status: ACTIVE | Noted: 2025-02-21

## 2025-02-21 PROCEDURE — OTHER COUNSELING: OTHER

## 2025-02-21 PROCEDURE — OTHER SUNSCREEN RECOMMENDATIONS: OTHER

## 2025-02-21 PROCEDURE — 99213 OFFICE O/P EST LOW 20 MIN: CPT

## 2025-02-21 ASSESSMENT — LOCATION SIMPLE DESCRIPTION DERM
LOCATION SIMPLE: SCALP
LOCATION SIMPLE: ABDOMEN
LOCATION SIMPLE: CHEST
LOCATION SIMPLE: RIGHT UPPER BACK

## 2025-02-21 ASSESSMENT — LOCATION DETAILED DESCRIPTION DERM
LOCATION DETAILED: MIDDLE STERNUM
LOCATION DETAILED: RIGHT INFERIOR UPPER BACK
LOCATION DETAILED: XIPHOID
LOCATION DETAILED: LEFT SUPERIOR POSTAURICULAR SKIN
LOCATION DETAILED: RIGHT MID-UPPER BACK
LOCATION DETAILED: LOWER STERNUM

## 2025-02-21 ASSESSMENT — LOCATION ZONE DERM
LOCATION ZONE: SCALP
LOCATION ZONE: TRUNK

## 2025-02-21 NOTE — HPI: FULL BODY SKIN EXAMINATION
What Is The Reason For Today's Visit?: Full Body Skin Examination
What Is The Reason For Today's Visit? (Being Monitored For X): concerning skin lesions on an annual basis
Additional History: Patient presents for annual skin exam.  No self history of skin cancer, mom with history of melanoma.  New raised bumps at previously biopsied site - present for a couple of months - asymptomatic. Biopsy was within in past 2 years.

## 2025-05-12 DIAGNOSIS — F41.9 ANXIETY AND DEPRESSION: ICD-10-CM

## 2025-05-12 DIAGNOSIS — F32.A ANXIETY AND DEPRESSION: ICD-10-CM

## 2025-05-13 RX ORDER — ESCITALOPRAM OXALATE 20 MG/1
20 TABLET ORAL DAILY
Qty: 90 TABLET | Refills: 1 | Status: SHIPPED | OUTPATIENT
Start: 2025-05-13

## 2025-08-05 DIAGNOSIS — F32.A ANXIETY AND DEPRESSION: ICD-10-CM

## 2025-08-05 DIAGNOSIS — F41.9 ANXIETY AND DEPRESSION: ICD-10-CM

## 2025-08-05 RX ORDER — ESCITALOPRAM OXALATE 20 MG/1
20 TABLET ORAL DAILY
Qty: 90 TABLET | Refills: 1 | Status: SHIPPED | OUTPATIENT
Start: 2025-08-05